# Patient Record
Sex: MALE | Race: WHITE | Employment: OTHER | URBAN - METROPOLITAN AREA
[De-identification: names, ages, dates, MRNs, and addresses within clinical notes are randomized per-mention and may not be internally consistent; named-entity substitution may affect disease eponyms.]

---

## 2017-01-19 DIAGNOSIS — I10 ESSENTIAL HYPERTENSION: ICD-10-CM

## 2017-01-19 RX ORDER — AMLODIPINE BESYLATE 5 MG/1
5 TABLET ORAL DAILY
Qty: 30 TABLET | Refills: 5 | Status: SHIPPED | OUTPATIENT
Start: 2017-01-19 | End: 2017-03-22 | Stop reason: SDUPTHER

## 2017-03-08 DIAGNOSIS — C61 PROSTATE CANCER (HCC): ICD-10-CM

## 2017-03-08 DIAGNOSIS — I10 ESSENTIAL HYPERTENSION: ICD-10-CM

## 2017-03-08 LAB
ALBUMIN SERPL-MCNC: 4 G/DL (ref 3.9–4.9)
ALP BLD-CCNC: 100 U/L (ref 35–104)
ALT SERPL-CCNC: 11 U/L (ref 0–41)
ANION GAP SERPL CALCULATED.3IONS-SCNC: 11 MEQ/L (ref 7–13)
AST SERPL-CCNC: 22 U/L (ref 0–40)
BASOPHILS ABSOLUTE: 0 K/UL (ref 0–0.2)
BASOPHILS RELATIVE PERCENT: 0.7 %
BILIRUB SERPL-MCNC: 0.5 MG/DL (ref 0–1.2)
BUN BLDV-MCNC: 19 MG/DL (ref 8–23)
CALCIUM SERPL-MCNC: 9 MG/DL (ref 8.6–10.2)
CHLORIDE BLD-SCNC: 102 MEQ/L (ref 98–107)
CO2: 27 MEQ/L (ref 22–29)
CREAT SERPL-MCNC: 0.91 MG/DL (ref 0.7–1.2)
EOSINOPHILS ABSOLUTE: 0.2 K/UL (ref 0–0.7)
EOSINOPHILS RELATIVE PERCENT: 3.5 %
GFR AFRICAN AMERICAN: >60
GFR NON-AFRICAN AMERICAN: >60
GLOBULIN: 2.5 G/DL (ref 2.3–3.5)
GLUCOSE BLD-MCNC: 82 MG/DL (ref 74–109)
HCT VFR BLD CALC: 43.5 % (ref 42–52)
HEMOGLOBIN: 14.7 G/DL (ref 14–18)
LYMPHOCYTES ABSOLUTE: 1.2 K/UL (ref 1–4.8)
LYMPHOCYTES RELATIVE PERCENT: 20.8 %
MCH RBC QN AUTO: 31.9 PG (ref 27–31.3)
MCHC RBC AUTO-ENTMCNC: 33.8 % (ref 33–37)
MCV RBC AUTO: 94.3 FL (ref 80–100)
MONOCYTES ABSOLUTE: 0.5 K/UL (ref 0.2–0.8)
MONOCYTES RELATIVE PERCENT: 9.1 %
NEUTROPHILS ABSOLUTE: 3.8 K/UL (ref 1.4–6.5)
NEUTROPHILS RELATIVE PERCENT: 65.9 %
PDW BLD-RTO: 13.3 % (ref 11.5–14.5)
PLATELET # BLD: 168 K/UL (ref 130–400)
POTASSIUM SERPL-SCNC: 4.2 MEQ/L (ref 3.5–5.1)
PROSTATE SPECIFIC ANTIGEN: 0.32 NG/ML (ref 0–6.22)
RBC # BLD: 4.61 M/UL (ref 4.7–6.1)
SODIUM BLD-SCNC: 140 MEQ/L (ref 132–144)
TOTAL PROTEIN: 6.5 G/DL (ref 6.4–8.1)
WBC # BLD: 5.8 K/UL (ref 4.8–10.8)

## 2017-03-22 ENCOUNTER — OFFICE VISIT (OUTPATIENT)
Dept: FAMILY MEDICINE CLINIC | Age: 77
End: 2017-03-22

## 2017-03-22 ENCOUNTER — TELEPHONE (OUTPATIENT)
Dept: FAMILY MEDICINE CLINIC | Age: 77
End: 2017-03-22

## 2017-03-22 VITALS
RESPIRATION RATE: 12 BRPM | WEIGHT: 149.38 LBS | HEART RATE: 66 BPM | DIASTOLIC BLOOD PRESSURE: 72 MMHG | BODY MASS INDEX: 21.39 KG/M2 | SYSTOLIC BLOOD PRESSURE: 116 MMHG | TEMPERATURE: 98.3 F | HEIGHT: 70 IN

## 2017-03-22 DIAGNOSIS — I63.40 CEREBRAL INFARCTION DUE TO EMBOLISM OF CEREBRAL ARTERY (HCC): ICD-10-CM

## 2017-03-22 DIAGNOSIS — E78.2 MIXED HYPERLIPIDEMIA: ICD-10-CM

## 2017-03-22 DIAGNOSIS — K51.40: ICD-10-CM

## 2017-03-22 DIAGNOSIS — I10 ESSENTIAL HYPERTENSION: Primary | ICD-10-CM

## 2017-03-22 DIAGNOSIS — C61 PROSTATE CANCER (HCC): ICD-10-CM

## 2017-03-22 PROCEDURE — 1036F TOBACCO NON-USER: CPT | Performed by: FAMILY MEDICINE

## 2017-03-22 PROCEDURE — G8484 FLU IMMUNIZE NO ADMIN: HCPCS | Performed by: FAMILY MEDICINE

## 2017-03-22 PROCEDURE — G8598 ASA/ANTIPLAT THER USED: HCPCS | Performed by: FAMILY MEDICINE

## 2017-03-22 PROCEDURE — G8427 DOCREV CUR MEDS BY ELIG CLIN: HCPCS | Performed by: FAMILY MEDICINE

## 2017-03-22 PROCEDURE — 1123F ACP DISCUSS/DSCN MKR DOCD: CPT | Performed by: FAMILY MEDICINE

## 2017-03-22 PROCEDURE — 99214 OFFICE O/P EST MOD 30 MIN: CPT | Performed by: FAMILY MEDICINE

## 2017-03-22 PROCEDURE — 4040F PNEUMOC VAC/ADMIN/RCVD: CPT | Performed by: FAMILY MEDICINE

## 2017-03-22 PROCEDURE — G8419 CALC BMI OUT NRM PARAM NOF/U: HCPCS | Performed by: FAMILY MEDICINE

## 2017-03-22 RX ORDER — AMLODIPINE BESYLATE 2.5 MG/1
2.5 TABLET ORAL DAILY
Qty: 30 TABLET | Refills: 5 | Status: SHIPPED | OUTPATIENT
Start: 2017-03-22 | End: 2017-09-30 | Stop reason: SDUPTHER

## 2017-03-22 ASSESSMENT — ENCOUNTER SYMPTOMS
VOMITING: 0
COUGH: 0
HEARTBURN: 0
HEMOPTYSIS: 0
CONSTIPATION: 0
NAUSEA: 0
DIARRHEA: 0
SHORTNESS OF BREATH: 0

## 2017-09-15 DIAGNOSIS — E78.2 MIXED HYPERLIPIDEMIA: ICD-10-CM

## 2017-09-15 DIAGNOSIS — I10 ESSENTIAL HYPERTENSION: ICD-10-CM

## 2017-09-15 DIAGNOSIS — C61 PROSTATE CANCER (HCC): ICD-10-CM

## 2017-09-15 LAB
ALBUMIN SERPL-MCNC: 4.2 G/DL (ref 3.9–4.9)
ALP BLD-CCNC: 99 U/L (ref 35–104)
ALT SERPL-CCNC: 12 U/L (ref 0–41)
ANION GAP SERPL CALCULATED.3IONS-SCNC: 19 MEQ/L (ref 7–13)
AST SERPL-CCNC: 22 U/L (ref 0–40)
BASOPHILS ABSOLUTE: 0 K/UL (ref 0–0.2)
BASOPHILS RELATIVE PERCENT: 0.8 %
BILIRUB SERPL-MCNC: 0.6 MG/DL (ref 0–1.2)
BUN BLDV-MCNC: 19 MG/DL (ref 8–23)
CALCIUM SERPL-MCNC: 9.3 MG/DL (ref 8.6–10.2)
CHLORIDE BLD-SCNC: 101 MEQ/L (ref 98–107)
CHOLESTEROL, TOTAL: 197 MG/DL (ref 0–199)
CO2: 22 MEQ/L (ref 22–29)
CREAT SERPL-MCNC: 0.86 MG/DL (ref 0.7–1.2)
EOSINOPHILS ABSOLUTE: 0.2 K/UL (ref 0–0.7)
EOSINOPHILS RELATIVE PERCENT: 4.7 %
GFR AFRICAN AMERICAN: >60
GFR NON-AFRICAN AMERICAN: >60
GLOBULIN: 2.9 G/DL (ref 2.3–3.5)
GLUCOSE BLD-MCNC: 103 MG/DL (ref 74–109)
HCT VFR BLD CALC: 46.8 % (ref 42–52)
HDLC SERPL-MCNC: 101 MG/DL (ref 40–59)
HEMOGLOBIN: 15.2 G/DL (ref 14–18)
LDL CHOLESTEROL CALCULATED: 78 MG/DL (ref 0–129)
LYMPHOCYTES ABSOLUTE: 1.2 K/UL (ref 1–4.8)
LYMPHOCYTES RELATIVE PERCENT: 22.4 %
MCH RBC QN AUTO: 31.7 PG (ref 27–31.3)
MCHC RBC AUTO-ENTMCNC: 32.5 % (ref 33–37)
MCV RBC AUTO: 97.3 FL (ref 80–100)
MONOCYTES ABSOLUTE: 0.4 K/UL (ref 0.2–0.8)
MONOCYTES RELATIVE PERCENT: 8 %
NEUTROPHILS ABSOLUTE: 3.3 K/UL (ref 1.4–6.5)
NEUTROPHILS RELATIVE PERCENT: 64.1 %
PDW BLD-RTO: 13.4 % (ref 11.5–14.5)
PLATELET # BLD: 179 K/UL (ref 130–400)
POTASSIUM SERPL-SCNC: 4.3 MEQ/L (ref 3.5–5.1)
PROSTATE SPECIFIC ANTIGEN: 0.77 NG/ML (ref 0–6.22)
RBC # BLD: 4.81 M/UL (ref 4.7–6.1)
SODIUM BLD-SCNC: 142 MEQ/L (ref 132–144)
TOTAL PROTEIN: 7.1 G/DL (ref 6.4–8.1)
TRIGL SERPL-MCNC: 88 MG/DL (ref 0–200)
WBC # BLD: 5.2 K/UL (ref 4.8–10.8)

## 2017-09-30 ENCOUNTER — OFFICE VISIT (OUTPATIENT)
Dept: FAMILY MEDICINE CLINIC | Age: 77
End: 2017-09-30

## 2017-09-30 VITALS
DIASTOLIC BLOOD PRESSURE: 66 MMHG | TEMPERATURE: 97.5 F | SYSTOLIC BLOOD PRESSURE: 108 MMHG | HEIGHT: 70 IN | HEART RATE: 60 BPM | WEIGHT: 149 LBS | RESPIRATION RATE: 12 BRPM | BODY MASS INDEX: 21.33 KG/M2

## 2017-09-30 DIAGNOSIS — I10 ESSENTIAL HYPERTENSION: Primary | ICD-10-CM

## 2017-09-30 DIAGNOSIS — I63.40 CEREBRAL INFARCTION DUE TO EMBOLISM OF CEREBRAL ARTERY (HCC): ICD-10-CM

## 2017-09-30 DIAGNOSIS — E78.2 MIXED HYPERLIPIDEMIA: ICD-10-CM

## 2017-09-30 DIAGNOSIS — C61 PROSTATE CANCER (HCC): ICD-10-CM

## 2017-09-30 DIAGNOSIS — Z23 NEED FOR INFLUENZA VACCINATION: ICD-10-CM

## 2017-09-30 PROCEDURE — 99214 OFFICE O/P EST MOD 30 MIN: CPT | Performed by: FAMILY MEDICINE

## 2017-09-30 PROCEDURE — 1123F ACP DISCUSS/DSCN MKR DOCD: CPT | Performed by: FAMILY MEDICINE

## 2017-09-30 PROCEDURE — G8420 CALC BMI NORM PARAMETERS: HCPCS | Performed by: FAMILY MEDICINE

## 2017-09-30 PROCEDURE — 1036F TOBACCO NON-USER: CPT | Performed by: FAMILY MEDICINE

## 2017-09-30 PROCEDURE — G8427 DOCREV CUR MEDS BY ELIG CLIN: HCPCS | Performed by: FAMILY MEDICINE

## 2017-09-30 PROCEDURE — G0008 ADMIN INFLUENZA VIRUS VAC: HCPCS | Performed by: FAMILY MEDICINE

## 2017-09-30 PROCEDURE — G8598 ASA/ANTIPLAT THER USED: HCPCS | Performed by: FAMILY MEDICINE

## 2017-09-30 PROCEDURE — 4040F PNEUMOC VAC/ADMIN/RCVD: CPT | Performed by: FAMILY MEDICINE

## 2017-09-30 PROCEDURE — 90662 IIV NO PRSV INCREASED AG IM: CPT | Performed by: FAMILY MEDICINE

## 2017-09-30 RX ORDER — AMLODIPINE BESYLATE 2.5 MG/1
2.5 TABLET ORAL DAILY
Qty: 90 TABLET | Refills: 3 | Status: SHIPPED | OUTPATIENT
Start: 2017-09-30 | End: 2018-09-20 | Stop reason: SDUPTHER

## 2017-09-30 ASSESSMENT — PATIENT HEALTH QUESTIONNAIRE - PHQ9
2. FEELING DOWN, DEPRESSED OR HOPELESS: 0
1. LITTLE INTEREST OR PLEASURE IN DOING THINGS: 0
SUM OF ALL RESPONSES TO PHQ9 QUESTIONS 1 & 2: 0
SUM OF ALL RESPONSES TO PHQ QUESTIONS 1-9: 0

## 2017-09-30 NOTE — PROGRESS NOTES
Well controlled, continue same medications. 2. Mixed hyperlipidemia  Comprehensive Metabolic Panel    Lipid Panel   3. Prostate cancer (HCC)  PSA, Diagnostic   4. 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 Need for influenza vaccination  INFLUENZA, HIGH DOSE, 65 YRS +, IM, PF, PREFILL SYR, 0.5ML (FLUZONE HD)     Plan for follow up: Follow up in 6 months for initial wellness. Other follow up as needed.       Electronically signed by Sol Ragland, 10:35 AM 9/30/17

## 2017-09-30 NOTE — MR AVS SNAPSHOT
After Visit Summary             Jace Anderson   2017 10:00 AM   Office Visit    Description:  Male : 1940   Provider:  Alex Esparza MD   Department:  [facility]              Your Follow-Up and Future Appointments         Below is a list of your follow-up and future appointments. This may not be a complete list as you may have made appointments directly with providers that we are not aware of or your providers may have made some for you. Please call your providers to confirm appointments. It is important to keep your appointments. Please bring your current insurance card, photo ID, co-pay, and all medication bottles to your appointment. If self-pay, payment is expected at the time of service. Your To-Do List     Future Appointments Provider Department Dept Phone    2018 10:15 AM Alex Esparza MD [facility] [phone]    Future Orders Complete By Expires    CBC Auto Differential [BZK2857 Custom]  3/19/2018 (Approximate) 2018    Comprehensive Metabolic Panel [IMM66 Custom]  3/19/2018 (Approximate) 2018    Lipid Panel [LAB18 Custom]  3/19/2018 (Approximate) 2018    PSA, Diagnostic [FQE9612 Custom]  3/19/2018 (Approximate) 2018    Follow-Up    Return in about 6 months (around 3/30/2018) for init med well. Information from Your Visit        Department     Name Address Phone Fax    [facility] PCP [address] [place] [address] [phone]      You Were Seen for:         Comments    Essential hypertension   [614714]   Well controlled, continue same medications. Vital Signs     Blood Pressure Pulse Temperature Respirations Height Weight    108/66 60 97.5 °F (36.4 °C) (Temporal) 12 5' 10\" (1.778 m) 149 lb (67.6 kg)    Body Mass Index Smoking Status                21.38 kg/m2 Never Smoker          Instructions    No visits with results within 2 Week(s) from this visit. Latest known visit with results is:    Orders Only on 09/15/2017   Component Date Value Ref Range Status    WBC 09/15/2017 5.2  4.8 - 10.8 K/uL Final    RBC 09/15/2017 4.81  4.70 - 6.10 M/uL Final    Hemoglobin 09/15/2017 15.2  14.0 - 18.0 g/dL Final    Hematocrit 09/15/2017 46.8  42.0 - 52.0 % Final    MCV 09/15/2017 97.3  80.0 - 100.0 fL Final    MCH 09/15/2017 31.7* 27.0 - 31.3 pg Final    MCHC 09/15/2017 32.5* 33.0 - 37.0 % Final    RDW 09/15/2017 13.4  11.5 - 14.5 % Final    Platelets 95/97/6062 179  130 - 400 K/uL Final    Neutrophils % 09/15/2017 64.1  % Final    Lymphocytes % 09/15/2017 22.4  % Final    Monocytes % 09/15/2017 8.0  % Final    Eosinophils % 09/15/2017 4.7  % Final    Basophils % 09/15/2017 0.8  % Final    Neutrophils # 09/15/2017 3.3  1.4 - 6.5 K/uL Final    Lymphocytes # 09/15/2017 1.2  1.0 - 4.8 K/uL Final    Monocytes # 09/15/2017 0.4  0.2 - 0.8 K/uL Final    Eosinophils # 09/15/2017 0.2  0.0 - 0.7 K/uL Final    Basophils # 09/15/2017 0.0  0.0 - 0.2 K/uL Final    Sodium 09/15/2017 142  132 - 144 mEq/L Final    Potassium 09/15/2017 4.3  3.5 - 5.1 mEq/L Final    Chloride 09/15/2017 101  98 - 107 mEq/L Final    CO2 09/15/2017 22  22 - 29 mEq/L Final    Anion Gap 09/15/2017 19* 7 - 13 mEq/L Final    Glucose 09/15/2017 103  74 - 109 mg/dL Final    BUN 09/15/2017 19  8 - 23 mg/dL Final    CREATININE 09/15/2017 0.86  0.70 - 1.20 mg/dL Final    GFR Non- 09/15/2017 >60.0  >60 Final    GFR  09/15/2017 >60.0  >60 Final    Calcium 09/15/2017 9.3  8.6 - 10.2 mg/dL Final    Total Protein 09/15/2017 7.1  6.4 - 8.1 g/dL Final    Alb 09/15/2017 4.2  3.9 - 4.9 g/dL Final    Total Bilirubin 09/15/2017 0.6  0.0 - 1.2 mg/dL Final    Alkaline Phosphatase 09/15/2017 99  35 - 104 U/L Final    ALT 09/15/2017 12  0 - 41 U/L Final    AST 09/15/2017 22  0 - 40 U/L Final    Globulin 09/15/2017 2.9  2.3 - 3.5 g/dL Final  Cholesterol, Total 09/15/2017 197  0 - 199 mg/dL Final    Triglycerides 09/15/2017 88  0 - 200 mg/dL Final    HDL 09/15/2017 101* 40 - 59 mg/dL Final    LDL Calculated 09/15/2017 78  0 - 129 mg/dL Final    PSA 09/15/2017 0.77  0.00 - 6.22 ng/mL Final   Last PSA was 0.37 and the one before was 0.03. Where to Get Your Medications      These medications were sent to [address] [place] [address] [address] [place], [address]  [address]     Phone:  [phone]     amLODIPine 2.5 MG tablet         Your Current Medications Are              amLODIPine (NORVASC) 2.5 MG tablet Take 1 tablet by mouth daily    Cholecalciferol (VITAMIN D-3) 5000 UNITS TABS Take  by mouth. aspirin 325 MG tablet Take 325 mg by mouth daily.         Allergies           No Known Allergies      We Ordered/Performed the following           INFLUENZA, HIGH DOSE, 65 YRS +, IM, PF, PREFILL SYR, 0.5ML (FLUZONE HD)          Additional Information        Basic Information     Date Of Birth Sex Race Ethnicity Preferred Language    1940 Male White Non-/Non  English      Problem List as of 9/30/2017  Date Reviewed: 9/30/2017                Mixed hyperlipidemia    Prostate cancer ([facility] [address]    Cerebral infarction [facility])    Essential hypertension    Related Goals    Blood Pressure < 140/90      Your Goals as of 9/30/2017 at 10:50 AM              Today    9/15/17    3/22/17       Blood Pressure    Blood Pressure < 140/90   108/66    116/72    Related Problems    Essential hypertension       Result Component    LDL CALC < 130     78        Immunizations as of 9/30/2017     Name Date    Influenza Vaccine, unspecified formulation 10/9/2014    Influenza, High Dose 9/30/2017, 9/22/2016, 11/10/2015    Pneumococcal 13-valent Conjugate ([id number]) 11/10/2015    Pneumococcal Polysaccharide ([id number]) 11/10/2003    Tetanus 10/14/2011      Preventive Care        Date Due

## 2017-09-30 NOTE — PATIENT INSTRUCTIONS
2.9  2.3 - 3.5 g/dL Final    Cholesterol, Total 09/15/2017 197  0 - 199 mg/dL Final    Triglycerides 09/15/2017 88  0 - 200 mg/dL Final    HDL 09/15/2017 101* 40 - 59 mg/dL Final    LDL Calculated 09/15/2017 78  0 - 129 mg/dL Final    PSA 09/15/2017 0.77  0.00 - 6.22 ng/mL Final   Last PSA was 0.37 and the one before was 0.03.

## 2018-02-05 ENCOUNTER — OFFICE VISIT (OUTPATIENT)
Dept: FAMILY MEDICINE CLINIC | Age: 78
End: 2018-02-05
Payer: MEDICARE

## 2018-02-05 VITALS
DIASTOLIC BLOOD PRESSURE: 82 MMHG | BODY MASS INDEX: 21.33 KG/M2 | WEIGHT: 149 LBS | RESPIRATION RATE: 24 BRPM | HEIGHT: 70 IN | HEART RATE: 72 BPM | TEMPERATURE: 98.1 F | SYSTOLIC BLOOD PRESSURE: 136 MMHG

## 2018-02-05 DIAGNOSIS — K40.90 RIGHT INGUINAL HERNIA: Primary | ICD-10-CM

## 2018-02-05 DIAGNOSIS — K40.90 LEFT INGUINAL HERNIA: ICD-10-CM

## 2018-02-05 PROCEDURE — G8427 DOCREV CUR MEDS BY ELIG CLIN: HCPCS | Performed by: FAMILY MEDICINE

## 2018-02-05 PROCEDURE — G8598 ASA/ANTIPLAT THER USED: HCPCS | Performed by: FAMILY MEDICINE

## 2018-02-05 PROCEDURE — 4040F PNEUMOC VAC/ADMIN/RCVD: CPT | Performed by: FAMILY MEDICINE

## 2018-02-05 PROCEDURE — 99213 OFFICE O/P EST LOW 20 MIN: CPT | Performed by: FAMILY MEDICINE

## 2018-02-05 PROCEDURE — G8420 CALC BMI NORM PARAMETERS: HCPCS | Performed by: FAMILY MEDICINE

## 2018-02-05 PROCEDURE — G8484 FLU IMMUNIZE NO ADMIN: HCPCS | Performed by: FAMILY MEDICINE

## 2018-02-05 PROCEDURE — 1036F TOBACCO NON-USER: CPT | Performed by: FAMILY MEDICINE

## 2018-02-05 PROCEDURE — 1123F ACP DISCUSS/DSCN MKR DOCD: CPT | Performed by: FAMILY MEDICINE

## 2018-02-05 NOTE — Clinical Note
Obvious right hernia, questionable left hernia in a patient who we will hopefully see Thursday afternoon, 2/8/18.

## 2018-02-08 ENCOUNTER — OFFICE VISIT (OUTPATIENT)
Dept: SURGERY | Age: 78
End: 2018-02-08
Payer: MEDICARE

## 2018-02-08 VITALS
WEIGHT: 162 LBS | RESPIRATION RATE: 12 BRPM | BODY MASS INDEX: 21.94 KG/M2 | HEIGHT: 72 IN | HEART RATE: 72 BPM | TEMPERATURE: 97.6 F | DIASTOLIC BLOOD PRESSURE: 70 MMHG | SYSTOLIC BLOOD PRESSURE: 140 MMHG

## 2018-02-08 DIAGNOSIS — K40.20 BILATERAL INGUINAL HERNIA WITHOUT OBSTRUCTION OR GANGRENE, RECURRENCE NOT SPECIFIED: Primary | ICD-10-CM

## 2018-02-08 PROCEDURE — 4040F PNEUMOC VAC/ADMIN/RCVD: CPT | Performed by: SURGERY

## 2018-02-08 PROCEDURE — G8420 CALC BMI NORM PARAMETERS: HCPCS | Performed by: SURGERY

## 2018-02-08 PROCEDURE — 99204 OFFICE O/P NEW MOD 45 MIN: CPT | Performed by: SURGERY

## 2018-02-08 PROCEDURE — G8427 DOCREV CUR MEDS BY ELIG CLIN: HCPCS | Performed by: SURGERY

## 2018-02-08 PROCEDURE — 1036F TOBACCO NON-USER: CPT | Performed by: SURGERY

## 2018-02-08 PROCEDURE — G8598 ASA/ANTIPLAT THER USED: HCPCS | Performed by: SURGERY

## 2018-02-08 PROCEDURE — G8484 FLU IMMUNIZE NO ADMIN: HCPCS | Performed by: SURGERY

## 2018-02-08 PROCEDURE — 1123F ACP DISCUSS/DSCN MKR DOCD: CPT | Performed by: SURGERY

## 2018-02-08 ASSESSMENT — ENCOUNTER SYMPTOMS
VOMITING: 0
TROUBLE SWALLOWING: 0
SHORTNESS OF BREATH: 0
EYE DISCHARGE: 0
BACK PAIN: 0
COLOR CHANGE: 0
CHOKING: 0
EYE PAIN: 0
ABDOMINAL DISTENTION: 0
ABDOMINAL PAIN: 0
CHEST TIGHTNESS: 0
WHEEZING: 0
ANAL BLEEDING: 0

## 2018-03-06 ENCOUNTER — OFFICE VISIT (OUTPATIENT)
Dept: FAMILY MEDICINE CLINIC | Age: 78
End: 2018-03-06
Payer: MEDICARE

## 2018-03-06 VITALS
SYSTOLIC BLOOD PRESSURE: 120 MMHG | RESPIRATION RATE: 12 BRPM | DIASTOLIC BLOOD PRESSURE: 78 MMHG | WEIGHT: 151.3 LBS | TEMPERATURE: 97.2 F | HEIGHT: 70 IN | BODY MASS INDEX: 21.66 KG/M2 | HEART RATE: 72 BPM

## 2018-03-06 DIAGNOSIS — Z00.00 ROUTINE GENERAL MEDICAL EXAMINATION AT A HEALTH CARE FACILITY: Primary | ICD-10-CM

## 2018-03-06 PROCEDURE — G8598 ASA/ANTIPLAT THER USED: HCPCS | Performed by: FAMILY MEDICINE

## 2018-03-06 PROCEDURE — G0438 PPPS, INITIAL VISIT: HCPCS | Performed by: FAMILY MEDICINE

## 2018-03-06 ASSESSMENT — ANXIETY QUESTIONNAIRES: GAD7 TOTAL SCORE: 0

## 2018-03-06 ASSESSMENT — PATIENT HEALTH QUESTIONNAIRE - PHQ9: SUM OF ALL RESPONSES TO PHQ QUESTIONS 1-9: 0

## 2018-03-06 ASSESSMENT — LIFESTYLE VARIABLES: HOW OFTEN DO YOU HAVE A DRINK CONTAINING ALCOHOL: 0

## 2018-03-06 NOTE — PROGRESS NOTES
Medicare Annual Wellness Visit  Name: Bryanna Rosario Date: 3/7/2018   MRN: [id number] Sex: Male   Age: 76 y.o. Ethnicity: Non-/Non    : 1940 Race: Martir Yun is here for [employer] for behavioral, psychosocial and functional/safety risks, and cognitive dysfunction are all negative except as indicated below. These results, as well as other patient data from the [address] form, are documented in Flowsheets linked to this Encounter. He has multiple questions about inguinal hernias. He has some. No Known Allergies    Prior to Visit Medications    Medication Sig Taking? Authorizing Provider   amLODIPine (NORVASC) 2.5 MG tablet Take 1 tablet by mouth daily Yes Carlos Becerra MD   Cholecalciferol (VITAMIN D-3) 5000 UNITS TABS Take  by mouth. Yes Historical Provider, MD   aspirin 325 MG tablet Take 325 mg by mouth daily.    Yes Historical Provider, MD       Past Medical History:   Diagnosis Date    Mini stroke ([facility] [address] [phone]    Mixed hyperlipidemia 3/22/2017    Prostate cancer ([facility] [address] 2015    Unspecified essential hypertension 10/16/2013     Past Surgical History:   Procedure Laterality Date    CARPAL TUNNEL RELEASE Left 14    [facility]    PROSTATE BIOPSY         Family History   Problem Relation Age of Onset    Stroke Mother     Heart Disease Father     Cancer Brother      prostate       CareTeam (Including outside providers/suppliers regularly involved in providing care):   Patient Care Team:  Carlos Becerra MD as PCP - General (Family Medicine)  Carlos Becerra MD as PCP - [facility] Attributed Provider    Wt Readings from Last 3 Encounters:   18 151 lb 4.8 oz (68.6 kg)   18 162 lb (73.5 kg)   18 149 lb (67.6 kg)     Vitals:    18 1425   BP: 120/78   Pulse: 72   Resp: 12   Temp: 97.2 °F (36.2 °C)   TempSrc: Temporal   Weight: 151 lb 4.8 oz (68.6 kg)   Height: 5' 10\" (1.778 m)       Neck: neck supple and non tender orders. Recommended screening schedule for the next 5-10 years is provided to the patient in written form: see Patient Instructions/AVS.  DIAGNOSIS:   1. Routine general medical examination at a health care facility       Plan for follow up: Follow up in 4 months with blood work as ordered in future orders. Other follow up as needed.       Electronically signed by Pamela Gonzalez, 10:59 AM 3/7/18

## 2018-03-08 ENCOUNTER — OFFICE VISIT (OUTPATIENT)
Dept: SURGERY | Age: 78
End: 2018-03-08
Payer: MEDICARE

## 2018-03-08 VITALS
TEMPERATURE: 97.5 F | WEIGHT: 151.2 LBS | BODY MASS INDEX: 21.69 KG/M2 | DIASTOLIC BLOOD PRESSURE: 76 MMHG | HEART RATE: 68 BPM | SYSTOLIC BLOOD PRESSURE: 122 MMHG

## 2018-03-08 DIAGNOSIS — Z01.818 PRE-OP TESTING: Primary | ICD-10-CM

## 2018-03-08 DIAGNOSIS — K40.20 BILATERAL INGUINAL HERNIA WITHOUT OBSTRUCTION OR GANGRENE, RECURRENCE NOT SPECIFIED: Primary | ICD-10-CM

## 2018-03-08 PROCEDURE — 4040F PNEUMOC VAC/ADMIN/RCVD: CPT | Performed by: SURGERY

## 2018-03-08 PROCEDURE — G8427 DOCREV CUR MEDS BY ELIG CLIN: HCPCS | Performed by: SURGERY

## 2018-03-08 PROCEDURE — G8598 ASA/ANTIPLAT THER USED: HCPCS | Performed by: SURGERY

## 2018-03-08 PROCEDURE — 99213 OFFICE O/P EST LOW 20 MIN: CPT | Performed by: SURGERY

## 2018-03-08 PROCEDURE — 1123F ACP DISCUSS/DSCN MKR DOCD: CPT | Performed by: SURGERY

## 2018-03-08 PROCEDURE — 1036F TOBACCO NON-USER: CPT | Performed by: SURGERY

## 2018-03-08 PROCEDURE — G8482 FLU IMMUNIZE ORDER/ADMIN: HCPCS | Performed by: SURGERY

## 2018-03-08 PROCEDURE — G8420 CALC BMI NORM PARAMETERS: HCPCS | Performed by: SURGERY

## 2018-03-08 RX ORDER — CALCIUM CARBONATE 500(1250)
500 TABLET ORAL DAILY
COMMUNITY

## 2018-03-08 ASSESSMENT — ENCOUNTER SYMPTOMS
WHEEZING: 0
EYE DISCHARGE: 0
ABDOMINAL PAIN: 0
CHOKING: 0
BACK PAIN: 0
ANAL BLEEDING: 0
TROUBLE SWALLOWING: 0
ABDOMINAL DISTENTION: 0
VOMITING: 0
EYE PAIN: 0
COLOR CHANGE: 0
CHEST TIGHTNESS: 0
SHORTNESS OF BREATH: 0

## 2018-03-08 NOTE — PROGRESS NOTES
hernia at patient's convenience. The risks, benefits and indications for repair of the hernia are reviewed with the patient. The potential of bleeding, infection, MI, PE/DVT, death, etc are reviewed. Anticipated convalescence is discussed. The use of prosthetic materials/ mesh is reviewed. All questions are answered and consent is obtained.         Angeline Keys MD

## 2018-03-23 ENCOUNTER — TELEPHONE (OUTPATIENT)
Dept: SURGERY | Age: 78
End: 2018-03-23

## 2018-07-17 DIAGNOSIS — C61 PROSTATE CANCER (HCC): ICD-10-CM

## 2018-07-17 DIAGNOSIS — I10 ESSENTIAL HYPERTENSION: ICD-10-CM

## 2018-07-17 DIAGNOSIS — E78.2 MIXED HYPERLIPIDEMIA: ICD-10-CM

## 2018-07-17 LAB
ALBUMIN SERPL-MCNC: 4.1 G/DL (ref 3.9–4.9)
ALP BLD-CCNC: 90 U/L (ref 35–104)
ALT SERPL-CCNC: 9 U/L (ref 0–41)
ANION GAP SERPL CALCULATED.3IONS-SCNC: 19 MEQ/L (ref 7–13)
AST SERPL-CCNC: 20 U/L (ref 0–40)
BASOPHILS ABSOLUTE: 0 K/UL (ref 0–0.2)
BASOPHILS RELATIVE PERCENT: 0.6 %
BILIRUB SERPL-MCNC: 0.6 MG/DL (ref 0–1.2)
BUN BLDV-MCNC: 18 MG/DL (ref 8–23)
CALCIUM SERPL-MCNC: 9.7 MG/DL (ref 8.6–10.2)
CHLORIDE BLD-SCNC: 102 MEQ/L (ref 98–107)
CHOLESTEROL, TOTAL: 161 MG/DL (ref 0–199)
CO2: 22 MEQ/L (ref 22–29)
CREAT SERPL-MCNC: 1.03 MG/DL (ref 0.7–1.2)
EOSINOPHILS ABSOLUTE: 0.2 K/UL (ref 0–0.7)
EOSINOPHILS RELATIVE PERCENT: 2.7 %
GFR AFRICAN AMERICAN: >60
GFR NON-AFRICAN AMERICAN: >60
GLOBULIN: 3.2 G/DL (ref 2.3–3.5)
GLUCOSE BLD-MCNC: 99 MG/DL (ref 74–109)
HCT VFR BLD CALC: 44.6 % (ref 42–52)
HDLC SERPL-MCNC: 74 MG/DL (ref 40–59)
HEMOGLOBIN: 15.2 G/DL (ref 14–18)
LDL CHOLESTEROL CALCULATED: 74 MG/DL (ref 0–129)
LYMPHOCYTES ABSOLUTE: 1 K/UL (ref 1–4.8)
LYMPHOCYTES RELATIVE PERCENT: 15.4 %
MCH RBC QN AUTO: 32.9 PG (ref 27–31.3)
MCHC RBC AUTO-ENTMCNC: 34.2 % (ref 33–37)
MCV RBC AUTO: 96.2 FL (ref 80–100)
MONOCYTES ABSOLUTE: 0.4 K/UL (ref 0.2–0.8)
MONOCYTES RELATIVE PERCENT: 6.8 %
NEUTROPHILS ABSOLUTE: 4.7 K/UL (ref 1.4–6.5)
NEUTROPHILS RELATIVE PERCENT: 74.5 %
PDW BLD-RTO: 13 % (ref 11.5–14.5)
PLATELET # BLD: 239 K/UL (ref 130–400)
POTASSIUM SERPL-SCNC: 4.5 MEQ/L (ref 3.5–5.1)
PROSTATE SPECIFIC ANTIGEN: 2.65 NG/ML (ref 0–6.22)
RBC # BLD: 4.64 M/UL (ref 4.7–6.1)
SODIUM BLD-SCNC: 143 MEQ/L (ref 132–144)
TOTAL PROTEIN: 7.3 G/DL (ref 6.4–8.1)
TRIGL SERPL-MCNC: 65 MG/DL (ref 0–200)
WBC # BLD: 6.3 K/UL (ref 4.8–10.8)

## 2018-07-24 ENCOUNTER — OFFICE VISIT (OUTPATIENT)
Dept: FAMILY MEDICINE CLINIC | Age: 78
End: 2018-07-24
Payer: MEDICARE

## 2018-07-24 VITALS
BODY MASS INDEX: 20.64 KG/M2 | OXYGEN SATURATION: 98 % | HEIGHT: 70 IN | TEMPERATURE: 97.4 F | HEART RATE: 67 BPM | WEIGHT: 144.2 LBS | SYSTOLIC BLOOD PRESSURE: 130 MMHG | DIASTOLIC BLOOD PRESSURE: 82 MMHG | RESPIRATION RATE: 10 BRPM

## 2018-07-24 DIAGNOSIS — Z00.00 ROUTINE GENERAL MEDICAL EXAMINATION AT A HEALTH CARE FACILITY: Primary | ICD-10-CM

## 2018-07-24 DIAGNOSIS — C61 PROSTATE CANCER (HCC): ICD-10-CM

## 2018-07-24 PROCEDURE — 4040F PNEUMOC VAC/ADMIN/RCVD: CPT | Performed by: FAMILY MEDICINE

## 2018-07-24 PROCEDURE — G8598 ASA/ANTIPLAT THER USED: HCPCS | Performed by: FAMILY MEDICINE

## 2018-07-24 PROCEDURE — G0438 PPPS, INITIAL VISIT: HCPCS | Performed by: FAMILY MEDICINE

## 2018-07-24 ASSESSMENT — LIFESTYLE VARIABLES: HOW OFTEN DO YOU HAVE A DRINK CONTAINING ALCOHOL: 0

## 2018-07-24 ASSESSMENT — PATIENT HEALTH QUESTIONNAIRE - PHQ9: SUM OF ALL RESPONSES TO PHQ QUESTIONS 1-9: 0

## 2018-07-24 ASSESSMENT — ANXIETY QUESTIONNAIRES: GAD7 TOTAL SCORE: 0

## 2018-07-24 NOTE — PROGRESS NOTES
Medicare Annual Wellness Visit  Name: Stan Contreras Date: 2018   MRN: [id number] Sex: Male   Age: 76 y.o. Ethnicity: Non-/Non    : 1940 Race: Alec Yun is here for [employer] for behavioral, psychosocial and functional/safety risks, and cognitive dysfunction are all negative except as indicated below. These results, as well as other patient data from the [address] [facility] form, are documented in Flowsheets linked to this Encounter. Review of recent lab work shows the PSA is up to 2.6. The patient has had prostate cancer and has had radiation therapy for that. No Known Allergies  Prior to Visit Medications    Medication Sig Taking? Authorizing Provider   calcium carbonate (OSCAL) 500 MG TABS tablet Take 500 mg by mouth daily Yes Historical Provider, MD   amLODIPine (NORVASC) 2.5 MG tablet Take 1 tablet by mouth daily Yes Mary Kate Pike MD   Cholecalciferol (VITAMIN D-3) 5000 UNITS TABS Take  by mouth. Yes Historical Provider, MD   aspirin 325 MG tablet Take 325 mg by mouth daily.    Yes Historical Provider, MD     Past Medical History:   Diagnosis Date    Mini stroke ([facility] [address] 1-1237    Mixed hyperlipidemia 3/22/2017    Prostate cancer ([facility] [address] 2015    Unspecified essential hypertension 10/16/2013     Past Surgical History:   Procedure Laterality Date    CARPAL TUNNEL RELEASE Left 14    [facility]    PROSTATE BIOPSY       Family History   Problem Relation Age of Onset    Stroke Mother     Heart Disease Father     Cancer Brother         prostate       CareTeam (Including outside providers/suppliers regularly involved in providing care):   Patient Care Team:  Mary Kate Pike MD as PCP - General (Family Medicine)  Mary Kate Pike MD as PCP - [facility] Attributed Provider    Wt Readings from Last 3 Encounters:   18 144 lb 3.2 oz (65.4 kg)   18 151 lb 3.2 oz (68.6 kg)   18 151 lb 4.8 oz (68.6 kg)     Vitals:    18 0757   BP:

## 2018-09-19 ENCOUNTER — TELEPHONE (OUTPATIENT)
Dept: FAMILY MEDICINE CLINIC | Age: 78
End: 2018-09-19

## 2018-09-20 DIAGNOSIS — I10 ESSENTIAL HYPERTENSION: ICD-10-CM

## 2018-09-21 RX ORDER — AMLODIPINE BESYLATE 2.5 MG/1
TABLET ORAL
Qty: 90 TABLET | Refills: 3 | Status: SHIPPED | OUTPATIENT
Start: 2018-09-21

## 2018-10-17 DIAGNOSIS — C61 PROSTATE CANCER (HCC): ICD-10-CM

## 2018-10-17 LAB — PROSTATE SPECIFIC ANTIGEN: 2.45 NG/ML (ref 0–6.22)

## 2018-10-24 ENCOUNTER — OFFICE VISIT (OUTPATIENT)
Dept: FAMILY MEDICINE CLINIC | Age: 78
End: 2018-10-24
Payer: MEDICARE

## 2018-10-24 VITALS
TEMPERATURE: 96.1 F | RESPIRATION RATE: 14 BRPM | DIASTOLIC BLOOD PRESSURE: 74 MMHG | WEIGHT: 148 LBS | OXYGEN SATURATION: 98 % | HEART RATE: 68 BPM | BODY MASS INDEX: 21.19 KG/M2 | HEIGHT: 70 IN | SYSTOLIC BLOOD PRESSURE: 128 MMHG

## 2018-10-24 DIAGNOSIS — E78.2 MIXED HYPERLIPIDEMIA: ICD-10-CM

## 2018-10-24 DIAGNOSIS — H61.23 BILATERAL IMPACTED CERUMEN: ICD-10-CM

## 2018-10-24 DIAGNOSIS — I10 ESSENTIAL HYPERTENSION: Primary | ICD-10-CM

## 2018-10-24 DIAGNOSIS — Z23 NEED FOR IMMUNIZATION AGAINST INFLUENZA: ICD-10-CM

## 2018-10-24 PROCEDURE — G8427 DOCREV CUR MEDS BY ELIG CLIN: HCPCS | Performed by: FAMILY MEDICINE

## 2018-10-24 PROCEDURE — G8482 FLU IMMUNIZE ORDER/ADMIN: HCPCS | Performed by: FAMILY MEDICINE

## 2018-10-24 PROCEDURE — G8420 CALC BMI NORM PARAMETERS: HCPCS | Performed by: FAMILY MEDICINE

## 2018-10-24 PROCEDURE — 90662 IIV NO PRSV INCREASED AG IM: CPT | Performed by: FAMILY MEDICINE

## 2018-10-24 PROCEDURE — 1101F PT FALLS ASSESS-DOCD LE1/YR: CPT | Performed by: FAMILY MEDICINE

## 2018-10-24 PROCEDURE — G8598 ASA/ANTIPLAT THER USED: HCPCS | Performed by: FAMILY MEDICINE

## 2018-10-24 PROCEDURE — G0008 ADMIN INFLUENZA VIRUS VAC: HCPCS | Performed by: FAMILY MEDICINE

## 2018-10-24 PROCEDURE — 99214 OFFICE O/P EST MOD 30 MIN: CPT | Performed by: FAMILY MEDICINE

## 2018-10-24 PROCEDURE — 1123F ACP DISCUSS/DSCN MKR DOCD: CPT | Performed by: FAMILY MEDICINE

## 2018-10-24 PROCEDURE — 1036F TOBACCO NON-USER: CPT | Performed by: FAMILY MEDICINE

## 2018-10-24 PROCEDURE — 4040F PNEUMOC VAC/ADMIN/RCVD: CPT | Performed by: FAMILY MEDICINE

## 2019-01-17 DIAGNOSIS — E78.2 MIXED HYPERLIPIDEMIA: ICD-10-CM

## 2019-01-17 DIAGNOSIS — I10 ESSENTIAL HYPERTENSION: ICD-10-CM

## 2019-01-17 LAB
ALBUMIN SERPL-MCNC: 4.1 G/DL (ref 3.9–4.9)
ALP BLD-CCNC: 91 U/L (ref 35–104)
ALT SERPL-CCNC: 9 U/L (ref 0–41)
ANION GAP SERPL CALCULATED.3IONS-SCNC: 15 MEQ/L (ref 7–13)
AST SERPL-CCNC: 20 U/L (ref 0–40)
BASOPHILS ABSOLUTE: 0 K/UL (ref 0–0.2)
BASOPHILS RELATIVE PERCENT: 0.8 %
BILIRUB SERPL-MCNC: 0.8 MG/DL (ref 0–1.2)
BUN BLDV-MCNC: 19 MG/DL (ref 8–23)
CALCIUM SERPL-MCNC: 9.3 MG/DL (ref 8.6–10.2)
CHLORIDE BLD-SCNC: 101 MEQ/L (ref 98–107)
CHOLESTEROL, TOTAL: 168 MG/DL (ref 0–199)
CO2: 27 MEQ/L (ref 22–29)
CREAT SERPL-MCNC: 1.01 MG/DL (ref 0.7–1.2)
EOSINOPHILS ABSOLUTE: 0.2 K/UL (ref 0–0.7)
EOSINOPHILS RELATIVE PERCENT: 3.2 %
GFR AFRICAN AMERICAN: >60
GFR NON-AFRICAN AMERICAN: >60
GLOBULIN: 3.2 G/DL (ref 2.3–3.5)
GLUCOSE BLD-MCNC: 85 MG/DL (ref 74–109)
HCT VFR BLD CALC: 45.9 % (ref 42–52)
HDLC SERPL-MCNC: 87 MG/DL (ref 40–59)
HEMOGLOBIN: 15.8 G/DL (ref 14–18)
LDL CHOLESTEROL CALCULATED: 68 MG/DL (ref 0–129)
LYMPHOCYTES ABSOLUTE: 1.1 K/UL (ref 1–4.8)
LYMPHOCYTES RELATIVE PERCENT: 21.1 %
MCH RBC QN AUTO: 32.9 PG (ref 27–31.3)
MCHC RBC AUTO-ENTMCNC: 34.4 % (ref 33–37)
MCV RBC AUTO: 95.6 FL (ref 80–100)
MONOCYTES ABSOLUTE: 0.4 K/UL (ref 0.2–0.8)
MONOCYTES RELATIVE PERCENT: 8.5 %
NEUTROPHILS ABSOLUTE: 3.4 K/UL (ref 1.4–6.5)
NEUTROPHILS RELATIVE PERCENT: 66.4 %
PDW BLD-RTO: 13.2 % (ref 11.5–14.5)
PLATELET # BLD: 213 K/UL (ref 130–400)
POTASSIUM SERPL-SCNC: 4.4 MEQ/L (ref 3.5–5.1)
RBC # BLD: 4.8 M/UL (ref 4.7–6.1)
SODIUM BLD-SCNC: 143 MEQ/L (ref 132–144)
TOTAL PROTEIN: 7.3 G/DL (ref 6.4–8.1)
TRIGL SERPL-MCNC: 65 MG/DL (ref 0–200)
WBC # BLD: 5.1 K/UL (ref 4.8–10.8)

## 2019-01-23 ENCOUNTER — OFFICE VISIT (OUTPATIENT)
Dept: FAMILY MEDICINE CLINIC | Age: 79
End: 2019-01-23
Payer: MEDICARE

## 2019-01-23 VITALS
SYSTOLIC BLOOD PRESSURE: 130 MMHG | HEART RATE: 66 BPM | BODY MASS INDEX: 20.73 KG/M2 | TEMPERATURE: 96.6 F | RESPIRATION RATE: 14 BRPM | WEIGHT: 144.8 LBS | HEIGHT: 70 IN | OXYGEN SATURATION: 97 % | DIASTOLIC BLOOD PRESSURE: 82 MMHG

## 2019-01-23 DIAGNOSIS — E78.2 MIXED HYPERLIPIDEMIA: ICD-10-CM

## 2019-01-23 DIAGNOSIS — I63.40 CEREBRAL INFARCTION DUE TO EMBOLISM OF CEREBRAL ARTERY (HCC): ICD-10-CM

## 2019-01-23 DIAGNOSIS — I10 ESSENTIAL HYPERTENSION: Primary | ICD-10-CM

## 2019-01-23 DIAGNOSIS — C61 PROSTATE CANCER (HCC): ICD-10-CM

## 2019-01-23 PROCEDURE — 4040F PNEUMOC VAC/ADMIN/RCVD: CPT | Performed by: FAMILY MEDICINE

## 2019-01-23 PROCEDURE — G8420 CALC BMI NORM PARAMETERS: HCPCS | Performed by: FAMILY MEDICINE

## 2019-01-23 PROCEDURE — G8482 FLU IMMUNIZE ORDER/ADMIN: HCPCS | Performed by: FAMILY MEDICINE

## 2019-01-23 PROCEDURE — 1036F TOBACCO NON-USER: CPT | Performed by: FAMILY MEDICINE

## 2019-01-23 PROCEDURE — 99214 OFFICE O/P EST MOD 30 MIN: CPT | Performed by: FAMILY MEDICINE

## 2019-01-23 PROCEDURE — G8427 DOCREV CUR MEDS BY ELIG CLIN: HCPCS | Performed by: FAMILY MEDICINE

## 2019-01-23 PROCEDURE — 1123F ACP DISCUSS/DSCN MKR DOCD: CPT | Performed by: FAMILY MEDICINE

## 2019-01-23 PROCEDURE — 1101F PT FALLS ASSESS-DOCD LE1/YR: CPT | Performed by: FAMILY MEDICINE

## 2019-01-23 PROCEDURE — G8598 ASA/ANTIPLAT THER USED: HCPCS | Performed by: FAMILY MEDICINE

## 2019-02-19 ENCOUNTER — TELEPHONE (OUTPATIENT)
Dept: FAMILY MEDICINE CLINIC | Age: 79
End: 2019-02-19

## 2019-02-27 ENCOUNTER — TELEPHONE (OUTPATIENT)
Dept: FAMILY MEDICINE CLINIC | Age: 79
End: 2019-02-27

## 2019-06-21 ENCOUNTER — TELEPHONE (OUTPATIENT)
Dept: ADMINISTRATIVE | Age: 79
End: 2019-06-21

## 2023-04-27 PROBLEM — M19.90 ARTHRITIS: Status: ACTIVE | Noted: 2023-04-27

## 2023-04-27 PROBLEM — N50.819 PERSISTENT PAIN IN TESTICLE: Status: ACTIVE | Noted: 2023-04-27

## 2023-04-27 PROBLEM — C61 PROSTATE CANCER (MULTI): Status: ACTIVE | Noted: 2023-04-27

## 2023-04-27 PROBLEM — E55.9 VITAMIN D DEFICIENCY: Status: ACTIVE | Noted: 2023-04-27

## 2023-04-27 PROBLEM — B37.0 ORAL THRUSH: Status: RESOLVED | Noted: 2023-04-27 | Resolved: 2023-04-27

## 2023-04-27 PROBLEM — I10 HTN (HYPERTENSION), BENIGN: Status: ACTIVE | Noted: 2023-04-27

## 2023-04-27 RX ORDER — VIT C/E/ZN/COPPR/LUTEIN/ZEAXAN 250MG-90MG
CAPSULE ORAL
COMMUNITY

## 2023-04-27 RX ORDER — AMLODIPINE BESYLATE 5 MG/1
10 TABLET ORAL DAILY
COMMUNITY
Start: 2019-11-26 | End: 2023-09-11 | Stop reason: DRUGHIGH

## 2023-04-27 RX ORDER — CHLORHEXIDINE GLUCONATE ORAL RINSE 1.2 MG/ML
SOLUTION DENTAL
COMMUNITY
Start: 2021-04-21 | End: 2023-07-28 | Stop reason: SDUPTHER

## 2023-04-27 RX ORDER — ZINC SULFATE 50(220)MG
CAPSULE ORAL
COMMUNITY

## 2023-04-27 RX ORDER — ASPIRIN 325 MG
TABLET ORAL
COMMUNITY

## 2023-04-27 RX ORDER — OXYBUTYNIN CHLORIDE 5 MG/1
1 TABLET ORAL NIGHTLY
COMMUNITY
Start: 2021-12-22 | End: 2023-05-01 | Stop reason: WASHOUT

## 2023-05-01 ENCOUNTER — OFFICE VISIT (OUTPATIENT)
Dept: PRIMARY CARE | Facility: CLINIC | Age: 83
End: 2023-05-01
Payer: MEDICARE

## 2023-05-01 VITALS
OXYGEN SATURATION: 98 % | DIASTOLIC BLOOD PRESSURE: 58 MMHG | WEIGHT: 148.6 LBS | HEART RATE: 84 BPM | TEMPERATURE: 98.4 F | HEIGHT: 70 IN | BODY MASS INDEX: 21.27 KG/M2 | SYSTOLIC BLOOD PRESSURE: 112 MMHG

## 2023-05-01 DIAGNOSIS — D68.59 HYPERCOAGULABLE STATE (MULTI): ICD-10-CM

## 2023-05-01 DIAGNOSIS — I10 HTN (HYPERTENSION), BENIGN: Primary | ICD-10-CM

## 2023-05-01 DIAGNOSIS — C61 PROSTATE CANCER (MULTI): ICD-10-CM

## 2023-05-01 DIAGNOSIS — E55.9 VITAMIN D DEFICIENCY: ICD-10-CM

## 2023-05-01 PROCEDURE — 99214 OFFICE O/P EST MOD 30 MIN: CPT | Performed by: FAMILY MEDICINE

## 2023-05-01 PROCEDURE — 3078F DIAST BP <80 MM HG: CPT | Performed by: FAMILY MEDICINE

## 2023-05-01 PROCEDURE — 1160F RVW MEDS BY RX/DR IN RCRD: CPT | Performed by: FAMILY MEDICINE

## 2023-05-01 PROCEDURE — 1159F MED LIST DOCD IN RCRD: CPT | Performed by: FAMILY MEDICINE

## 2023-05-01 PROCEDURE — 1036F TOBACCO NON-USER: CPT | Performed by: FAMILY MEDICINE

## 2023-05-01 PROCEDURE — 3074F SYST BP LT 130 MM HG: CPT | Performed by: FAMILY MEDICINE

## 2023-05-01 RX ORDER — PANTOPRAZOLE SODIUM 40 MG/1
40 TABLET, DELAYED RELEASE ORAL DAILY
COMMUNITY
Start: 2023-02-16 | End: 2023-05-01 | Stop reason: WASHOUT

## 2023-05-01 ASSESSMENT — ENCOUNTER SYMPTOMS
LOSS OF SENSATION IN FEET: 0
DEPRESSION: 0
OCCASIONAL FEELINGS OF UNSTEADINESS: 0

## 2023-05-01 NOTE — PROGRESS NOTES
"Subjective   Patient ID:  Nayan Tesfaye is a 82 y.o. male patient who presents today for       Past Medical, Surgical, and Family History reviewed and updated in chart.     Reviewed all medications by prescribing practitioner or clinical pharmacist (such as prescriptions, OTCs, herbal therapies and supplements) and documented in the medical record.     Hypertension: The patient is here for follow-up of elevated blood pressure and denies having any side effects to prescribed medication.  The patient is trying to follow a low-salt diet. He is adherent to a low salt diet. Blood pressure is well controlled at home. Patient reports no side effects to antihypertensive medication. The patient has not been hospitalized for this in the last 6 months.  Patient denies any side effects to the medications.     Arthritis: Nayan Tesfaye is an 82 y.o. male who presents with arthralgias that began several months ago. Pain is located in shoulders and knees. Patient denies any side effects to the medications.     Prostate cancer has been in remission we will follow.  Vitamin D deficiency stable with patient supplementing.  Hypercoagulable state present with patient asymptomatic and taking full-strength aspirin daily.  The patient denies SOB, dizziness, headaches, nausea, vomiting, constipation, chest pain, chest pressure.      Patient Care Team:  Christian Paiz MD as PCP - General  Christian Paiz MD [facility]        Objective   Vitals:  /58   Pulse 84   Temp 36.9 °C (98.4 °F)   Ht 1.778 m (5' 10\")   Wt 67.4 kg (148 lb 9.6 oz)   SpO2 98%   BMI 21.32 kg/m²     Physical Exam  Vitals reviewed.   Constitutional:       Appearance: Normal appearance.   Neck:      Vascular: No carotid bruit.   Cardiovascular:      Rate and Rhythm: Normal rate and regular rhythm.      Pulses: Normal pulses.      Heart sounds: Normal heart sounds.   Pulmonary:      Effort: Pulmonary effort is normal. No respiratory " distress.      Breath sounds: Normal breath sounds. No wheezing.   Abdominal:      General: There is no distension.      Palpations: Abdomen is soft. There is no mass.      Tenderness: There is no abdominal tenderness. There is no right CVA tenderness, left CVA tenderness, guarding or rebound.   Musculoskeletal:      Cervical back: Normal range of motion and neck supple. No rigidity.      Right lower leg: No edema.      Left lower leg: No edema.   Lymphadenopathy:      Cervical: No cervical adenopathy.   Neurological:      Mental Status: He is alert.         CMP, CBC, PSA, Lipid from 1/26/23.      Assessment/Plan   Problem List Items Addressed This Visit          Circulatory    HTN (hypertension), benign - Primary    Current Assessment & Plan      Is well controlled, continue with current medications.         Relevant Orders    Follow Up In Advanced Primary Care - PCP    CBC and Auto Differential    Comprehensive Metabolic Panel    Lipid Panel    Magnesium       Genitourinary    Prostate cancer (CMS/HCC)    Current Assessment & Plan      Is stable, continue with current treatment.            Endocrine/Metabolic    Vitamin D deficiency    Current Assessment & Plan      Is stable, continue with current treatment.            Hematologic    Hypercoagulable state (CMS/HCC)    Current Assessment & Plan      Is stable, continue with current treatment.            Follow up in: 6 months with labs prior.

## 2023-07-28 DIAGNOSIS — L85.3 DRY SKIN: Primary | ICD-10-CM

## 2023-07-28 RX ORDER — CHLORHEXIDINE GLUCONATE ORAL RINSE 1.2 MG/ML
15 SOLUTION DENTAL AS NEEDED
Qty: 120 ML | Refills: 1 | Status: SHIPPED | OUTPATIENT
Start: 2023-07-28 | End: 2023-12-29 | Stop reason: SDUPTHER

## 2023-07-28 NOTE — TELEPHONE ENCOUNTER
Rx Refill Request Telephone Encounter    Name:  Nayan Tesfaye  :  [id number]  Medication Name:  Chlorhexidine Gluconate 0.12% mouth/throat solution  Specific Pharmacy location:  [address]  Date of last appointment:    Date of next appointment:    Best number to reach patient:  [phone]

## 2023-08-29 ENCOUNTER — APPOINTMENT (OUTPATIENT)
Dept: PRIMARY CARE | Facility: CLINIC | Age: 83
End: 2023-08-29
Payer: MEDICARE

## 2023-09-06 NOTE — PROGRESS NOTES
"Subjective    Patient ID: Nayan Tesfaye is a 82 y.o. male who presents for Hypertension.     The patient is compliant with medications. Patient denies any side effects to the medications.      Hypertension: The patient is here for follow-up of elevated blood pressure. He is adherent to a low salt diet. Blood pressure is well controlled at home. No new myalgias or GI upset on diet control.    Prostate cancer: The patient is in remission. Is clinically stable so we will continue with current medications and lab work to confirm status ordered. Personal history of prostate cancer in 2016, currently still on lupron injections. Dr. Cornell     Ascending aorta aneurysm: Noted incidentally on chest CT 8/8/23, measuring 4.9 cm. Evaluated by cardiology 8/25- referred to CTS for further evaluation. Scheduled 9/28/23 for CTS appt and echo.    Lung nodule: 2.1 cm RUL nodule noted on chest CT during evaluation of chest pain following fall 8/8/23. Appears to have slightly increased in size from PET/CT in 2019 (without significant uptake). Never smoker. Though this is concerning for malignancy, given our discussion of goals of care and health status of patient, recommend updating PET for further evaluation.  Will reach out to his other providers to review pulmonary findings and plan of care. PET results will largely play a role in collaborative next steps.     The patient denies having the following symptoms: chest pain, chest pressure, fever, chills, N/V/D, constipation, dizziness, headaches, SOB.          Objective   Vitals:  /70   Pulse 72   Temp 35.8 °C (96.5 °F)   Resp 12   Ht 1.778 m (5' 10\")   Wt 65.5 kg (144 lb 6.4 oz)   SpO2 96%   BMI 20.72 kg/m²     Physical Exam  Vitals reviewed.   Constitutional:       Appearance: Normal appearance.   Neck:      Vascular: No carotid bruit.   Cardiovascular:      Rate and Rhythm: Normal rate and regular rhythm.      Pulses: Normal pulses.      Heart sounds: Normal heart " sounds.   Pulmonary:      Effort: Pulmonary effort is normal. No respiratory distress.      Breath sounds: Normal breath sounds. No wheezing.   Abdominal:      General: There is no distension.      Palpations: Abdomen is soft. There is no mass.      Tenderness: There is no abdominal tenderness. There is no right CVA tenderness, left CVA tenderness, guarding or rebound.   Musculoskeletal:      Cervical back: Normal range of motion and neck supple. No rigidity.      Right lower leg: No edema.      Left lower leg: No edema.   Lymphadenopathy:      Cervical: No cervical adenopathy.   Neurological:      Mental Status: He is alert.            Labs active- future.         Assessment/Plan   Problem List Items Addressed This Visit       HTN (hypertension), benign - Primary      Is stable, continue with current treatment.          Relevant Orders    Follow Up In Advanced Primary Care - PCP - Established    Malignant neoplasm of prostate (CMS/HCC)     Patient is in remission and follows with Urology          Incidental lung nodule     2.1 cm RUL nodule noted on chest CT during evaluation of chest pain following fall 8/8/23. Appears to have slightly increased in size from PET/CT in 2019 (without significant uptake). Never smoker. Though this is concerning for malignancy, given our discussion of goals of care and health status of patient, recommend updating PET for further evaluation.  Will reach out to his other providers to review pulmonary findings and plan of care. PET results will largely play a role in collaborative next steps.          Aneurysm of ascending aorta without rupture (CMS/HCC)     Noted incidentally on chest CT 8/8/23, measuring 4.9 cm. Evaluated by cardiology 8/25- referred to CTS for further evaluation. Scheduled 9/28/23 for CTS appt and echo.          Other Visit Diagnoses       Need for influenza vaccination        Relevant Orders    Flu vaccine, quadrivalent, high-dose, preservative free, age 65y+  (FLUZONE)            Follow up in: 6 month(s) or sooner if needed with labs today.     Scribe Attestation  By signing my name below, I, Anurag Dobbins   attest that this documentation has been prepared under the direction and in the presence of Christian Paiz MD.

## 2023-09-11 ENCOUNTER — OFFICE VISIT (OUTPATIENT)
Dept: PRIMARY CARE | Facility: CLINIC | Age: 83
End: 2023-09-11
Payer: MEDICARE

## 2023-09-11 ENCOUNTER — LAB (OUTPATIENT)
Dept: LAB | Facility: LAB | Age: 83
End: 2023-09-11
Payer: MEDICARE

## 2023-09-11 VITALS
TEMPERATURE: 96.5 F | DIASTOLIC BLOOD PRESSURE: 70 MMHG | OXYGEN SATURATION: 96 % | BODY MASS INDEX: 20.67 KG/M2 | HEART RATE: 72 BPM | SYSTOLIC BLOOD PRESSURE: 118 MMHG | WEIGHT: 144.4 LBS | RESPIRATION RATE: 12 BRPM | HEIGHT: 70 IN

## 2023-09-11 DIAGNOSIS — I10 HTN (HYPERTENSION), BENIGN: Primary | ICD-10-CM

## 2023-09-11 DIAGNOSIS — Z23 NEED FOR INFLUENZA VACCINATION: ICD-10-CM

## 2023-09-11 DIAGNOSIS — I10 HTN (HYPERTENSION), BENIGN: ICD-10-CM

## 2023-09-11 DIAGNOSIS — C61 PROSTATE CANCER (MULTI): ICD-10-CM

## 2023-09-11 DIAGNOSIS — R91.1 INCIDENTAL LUNG NODULE: ICD-10-CM

## 2023-09-11 DIAGNOSIS — I71.21 ANEURYSM OF ASCENDING AORTA WITHOUT RUPTURE (CMS-HCC): ICD-10-CM

## 2023-09-11 LAB
ALANINE AMINOTRANSFERASE (SGPT) (U/L) IN SER/PLAS: 12 U/L (ref 10–52)
ALBUMIN (G/DL) IN SER/PLAS: 4.3 G/DL (ref 3.4–5)
ALKALINE PHOSPHATASE (U/L) IN SER/PLAS: 125 U/L (ref 33–136)
ANION GAP IN SER/PLAS: 15 MMOL/L (ref 10–20)
ASPARTATE AMINOTRANSFERASE (SGOT) (U/L) IN SER/PLAS: 21 U/L (ref 9–39)
BASOPHILS (10*3/UL) IN BLOOD BY AUTOMATED COUNT: 0.02 X10E9/L (ref 0–0.1)
BASOPHILS/100 LEUKOCYTES IN BLOOD BY AUTOMATED COUNT: 0.4 % (ref 0–2)
BILIRUBIN TOTAL (MG/DL) IN SER/PLAS: 0.6 MG/DL (ref 0–1.2)
CALCIUM (MG/DL) IN SER/PLAS: 9.5 MG/DL (ref 8.6–10.3)
CARBON DIOXIDE, TOTAL (MMOL/L) IN SER/PLAS: 27 MMOL/L (ref 21–32)
CHLORIDE (MMOL/L) IN SER/PLAS: 100 MMOL/L (ref 98–107)
CHOLESTEROL (MG/DL) IN SER/PLAS: 182 MG/DL (ref 0–199)
CHOLESTEROL IN HDL (MG/DL) IN SER/PLAS: 76.6 MG/DL
CHOLESTEROL/HDL RATIO: 2.4
CREATININE (MG/DL) IN SER/PLAS: 1.03 MG/DL (ref 0.5–1.3)
EOSINOPHILS (10*3/UL) IN BLOOD BY AUTOMATED COUNT: 0.08 X10E9/L (ref 0–0.4)
EOSINOPHILS/100 LEUKOCYTES IN BLOOD BY AUTOMATED COUNT: 1.5 % (ref 0–6)
ERYTHROCYTE DISTRIBUTION WIDTH (RATIO) BY AUTOMATED COUNT: 12.5 % (ref 11.5–14.5)
ERYTHROCYTE MEAN CORPUSCULAR HEMOGLOBIN CONCENTRATION (G/DL) BY AUTOMATED: 33.8 G/DL (ref 32–36)
ERYTHROCYTE MEAN CORPUSCULAR VOLUME (FL) BY AUTOMATED COUNT: 96 FL (ref 80–100)
ERYTHROCYTES (10*6/UL) IN BLOOD BY AUTOMATED COUNT: 4.72 X10E12/L (ref 4.5–5.9)
GFR MALE: 71 ML/MIN/1.73M2
GLUCOSE (MG/DL) IN SER/PLAS: 83 MG/DL (ref 74–99)
HEMATOCRIT (%) IN BLOOD BY AUTOMATED COUNT: 45.2 % (ref 41–52)
HEMOGLOBIN (G/DL) IN BLOOD: 15.3 G/DL (ref 13.5–17.5)
IMMATURE GRANULOCYTES/100 LEUKOCYTES IN BLOOD BY AUTOMATED COUNT: 0.2 % (ref 0–0.9)
LDL: 81 MG/DL (ref 0–99)
LEUKOCYTES (10*3/UL) IN BLOOD BY AUTOMATED COUNT: 5.3 X10E9/L (ref 4.4–11.3)
LYMPHOCYTES (10*3/UL) IN BLOOD BY AUTOMATED COUNT: 0.93 X10E9/L (ref 0.8–3)
LYMPHOCYTES/100 LEUKOCYTES IN BLOOD BY AUTOMATED COUNT: 17.5 % (ref 13–44)
MAGNESIUM (MG/DL) IN SER/PLAS: 2.13 MG/DL (ref 1.6–2.4)
MONOCYTES (10*3/UL) IN BLOOD BY AUTOMATED COUNT: 0.79 X10E9/L (ref 0.05–0.8)
MONOCYTES/100 LEUKOCYTES IN BLOOD BY AUTOMATED COUNT: 14.9 % (ref 2–10)
NEUTROPHILS (10*3/UL) IN BLOOD BY AUTOMATED COUNT: 3.48 X10E9/L (ref 1.6–5.5)
NEUTROPHILS/100 LEUKOCYTES IN BLOOD BY AUTOMATED COUNT: 65.5 % (ref 40–80)
PLATELETS (10*3/UL) IN BLOOD AUTOMATED COUNT: 194 X10E9/L (ref 150–450)
POTASSIUM (MMOL/L) IN SER/PLAS: 3.9 MMOL/L (ref 3.5–5.3)
PROSTATE SPECIFIC AG (NG/ML) IN SER/PLAS: 1.25 NG/ML (ref 0–4)
PROTEIN TOTAL: 7.3 G/DL (ref 6.4–8.2)
SODIUM (MMOL/L) IN SER/PLAS: 138 MMOL/L (ref 136–145)
TRIGLYCERIDE (MG/DL) IN SER/PLAS: 123 MG/DL (ref 0–149)
UREA NITROGEN (MG/DL) IN SER/PLAS: 17 MG/DL (ref 6–23)
VLDL: 25 MG/DL (ref 0–40)

## 2023-09-11 PROCEDURE — 99213 OFFICE O/P EST LOW 20 MIN: CPT | Performed by: FAMILY MEDICINE

## 2023-09-11 PROCEDURE — 1160F RVW MEDS BY RX/DR IN RCRD: CPT | Performed by: FAMILY MEDICINE

## 2023-09-11 PROCEDURE — 3074F SYST BP LT 130 MM HG: CPT | Performed by: FAMILY MEDICINE

## 2023-09-11 PROCEDURE — 90662 IIV NO PRSV INCREASED AG IM: CPT | Performed by: FAMILY MEDICINE

## 2023-09-11 PROCEDURE — 36415 COLL VENOUS BLD VENIPUNCTURE: CPT

## 2023-09-11 PROCEDURE — 83735 ASSAY OF MAGNESIUM: CPT

## 2023-09-11 PROCEDURE — 84153 ASSAY OF PSA TOTAL: CPT

## 2023-09-11 PROCEDURE — G0008 ADMIN INFLUENZA VIRUS VAC: HCPCS | Performed by: FAMILY MEDICINE

## 2023-09-11 PROCEDURE — 80053 COMPREHEN METABOLIC PANEL: CPT

## 2023-09-11 PROCEDURE — 3078F DIAST BP <80 MM HG: CPT | Performed by: FAMILY MEDICINE

## 2023-09-11 PROCEDURE — 85025 COMPLETE CBC W/AUTO DIFF WBC: CPT

## 2023-09-11 PROCEDURE — 80061 LIPID PANEL: CPT

## 2023-09-11 PROCEDURE — 1036F TOBACCO NON-USER: CPT | Performed by: FAMILY MEDICINE

## 2023-09-11 PROCEDURE — 1159F MED LIST DOCD IN RCRD: CPT | Performed by: FAMILY MEDICINE

## 2023-09-11 RX ORDER — AMLODIPINE BESYLATE 10 MG/1
10 TABLET ORAL DAILY
COMMUNITY
End: 2024-03-22 | Stop reason: SDUPTHER

## 2023-09-11 NOTE — ASSESSMENT & PLAN NOTE
2.1 cm RUL nodule noted on chest CT during evaluation of chest pain following fall 8/8/23. Appears to have slightly increased in size from PET/CT in 2019 (without significant uptake). Never smoker. Though this is concerning for malignancy, given our discussion of goals of care and health status of patient, recommend updating PET for further evaluation.  Will reach out to his other providers to review pulmonary findings and plan of care. PET results will largely play a role in collaborative next steps.

## 2023-09-11 NOTE — ASSESSMENT & PLAN NOTE
Noted incidentally on chest CT 8/8/23, measuring 4.9 cm. Evaluated by cardiology 8/25- referred to CTS for further evaluation. Scheduled 9/28/23 for CTS appt and echo.

## 2023-11-02 ENCOUNTER — HOSPITAL ENCOUNTER (OUTPATIENT)
Age: 83
Setting detail: OBSERVATION
Discharge: LEFT BEFORE DISCHARGE | End: 2023-11-03
Attending: EMERGENCY MEDICINE | Admitting: INTERNAL MEDICINE
Payer: MEDICARE

## 2023-11-02 ENCOUNTER — APPOINTMENT (OUTPATIENT)
Dept: CT IMAGING | Age: 83
End: 2023-11-02
Payer: MEDICARE

## 2023-11-02 DIAGNOSIS — R55 SYNCOPE AND COLLAPSE: Primary | ICD-10-CM

## 2023-11-02 DIAGNOSIS — R41.0 TRANSIENT CONFUSION: ICD-10-CM
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
## 2023-11-02 LAB
ALBUMIN SERPL-MCNC: 4.1 G/DL (ref 3.5–4.6)
ALP SERPL-CCNC: 120 U/L (ref 35–104)
ALT SERPL-CCNC: 13 U/L (ref 0–41)
ANION GAP SERPL CALCULATED.3IONS-SCNC: 12 MEQ/L (ref 9–15)
AST SERPL-CCNC: 21 U/L (ref 0–40)
BASOPHILS # BLD: 0 K/UL (ref 0–0.2)
BASOPHILS NFR BLD: 0.4 %
BILIRUB SERPL-MCNC: 0.3 MG/DL (ref 0.2–0.7)
BILIRUB UR QL STRIP: NEGATIVE
BUN SERPL-MCNC: 24 MG/DL (ref 8–23)
CALCIUM SERPL-MCNC: 9.1 MG/DL (ref 8.5–9.9)
CHLORIDE SERPL-SCNC: 102 MEQ/L (ref 95–107)
CLARITY UR: CLEAR
CO2 SERPL-SCNC: 25 MEQ/L (ref 20–31)
COLOR UR: YELLOW
CREAT SERPL-MCNC: 0.94 MG/DL (ref 0.7–1.2)
EOSINOPHIL # BLD: 0.2 K/UL (ref 0–0.7)
EOSINOPHIL NFR BLD: 4.4 %
ERYTHROCYTE [DISTWIDTH] IN BLOOD BY AUTOMATED COUNT: 12.4 % (ref 11.5–14.5)
GLOBULIN SER CALC-MCNC: 2.9 G/DL (ref 2.3–3.5)
GLUCOSE SERPL-MCNC: 99 MG/DL (ref 70–99)
GLUCOSE UR STRIP-MCNC: NEGATIVE MG/DL
HCT VFR BLD AUTO: 41 % (ref 42–52)
HGB BLD-MCNC: 14.2 G/DL (ref 14–18)
HGB UR QL STRIP: NEGATIVE
KETONES UR STRIP-MCNC: NEGATIVE MG/DL
LEUKOCYTE ESTERASE UR QL STRIP: NEGATIVE
LYMPHOCYTES # BLD: 1.1 K/UL (ref 1–4.8)
LYMPHOCYTES NFR BLD: 21.8 %
MCH RBC QN AUTO: 32.3 PG (ref 27–31.3)
MCHC RBC AUTO-ENTMCNC: 34.6 % (ref 33–37)
MCV RBC AUTO: 93.4 FL (ref 79–92.2)
MONOCYTES # BLD: 0.6 K/UL (ref 0.2–0.8)
MONOCYTES NFR BLD: 11.1 %
NEUTROPHILS # BLD: 3.3 K/UL (ref 1.4–6.5)
NEUTS SEG NFR BLD: 62.1 %
NITRITE UR QL STRIP: NEGATIVE
PH UR STRIP: 5.5 [PH] (ref 5–9)
PLATELET # BLD AUTO: 201 K/UL (ref 130–400)
POTASSIUM SERPL-SCNC: 3.6 MEQ/L (ref 3.4–4.9)
PROT SERPL-MCNC: 7 G/DL (ref 6.3–8)
PROT UR STRIP-MCNC: ABNORMAL MG/DL
RBC # BLD AUTO: 4.39 M/UL (ref 4.7–6.1)
SODIUM SERPL-SCNC: 139 MEQ/L (ref 135–144)
SP GR UR STRIP: 1.02 (ref 1–1.03)
TROPONIN, HIGH SENSITIVITY: 12 NG/L (ref 0–19)
TROPONIN, HIGH SENSITIVITY: 12 NG/L (ref 0–19)
URINE REFLEX TO CULTURE: ABNORMAL
UROBILINOGEN UR STRIP-ACNC: 0.2 E.U./DL
WBC # BLD AUTO: 5.2 K/UL (ref 4.8–10.8)

## 2023-11-02 PROCEDURE — 2580000003 HC RX 258: Performed by: INTERNAL MEDICINE

## 2023-11-02 PROCEDURE — 96360 HYDRATION IV INFUSION INIT: CPT

## 2023-11-02 PROCEDURE — 80053 COMPREHEN METABOLIC PANEL: CPT

## 2023-11-02 PROCEDURE — 81003 URINALYSIS AUTO W/O SCOPE: CPT

## 2023-11-02 PROCEDURE — 70450 CT HEAD/BRAIN W/O DYE: CPT

## 2023-11-02 PROCEDURE — 85025 COMPLETE CBC W/AUTO DIFF WBC: CPT

## 2023-11-02 PROCEDURE — 6830039000 HC L3 TRAUMA ALERT

## 2023-11-02 PROCEDURE — 99285 EMERGENCY DEPT VISIT HI MDM: CPT

## 2023-11-02 PROCEDURE — 84484 ASSAY OF TROPONIN QUANT: CPT

## 2023-11-02 PROCEDURE — 82550 ASSAY OF CK (CPK): CPT

## 2023-11-02 PROCEDURE — 93005 ELECTROCARDIOGRAM TRACING: CPT | Performed by: EMERGENCY MEDICINE

## 2023-11-02 PROCEDURE — 36415 COLL VENOUS BLD VENIPUNCTURE: CPT

## 2023-11-02 PROCEDURE — 2580000003 HC RX 258: Performed by: EMERGENCY MEDICINE

## 2023-11-02 RX ORDER — POLYETHYLENE GLYCOL 3350 17 G/17G
17 POWDER, FOR SOLUTION ORAL DAILY PRN
Status: DISCONTINUED | OUTPATIENT
Start: 2023-11-02 | End: 2023-11-03 | Stop reason: HOSPADM

## 2023-11-02 RX ORDER — MAGNESIUM SULFATE IN WATER 40 MG/ML
2000 INJECTION, SOLUTION INTRAVENOUS PRN
Status: DISCONTINUED | OUTPATIENT
Start: 2023-11-02 | End: 2023-11-03 | Stop reason: HOSPADM

## 2023-11-02 RX ORDER — ENOXAPARIN SODIUM 100 MG/ML
40 INJECTION SUBCUTANEOUS DAILY
Status: DISCONTINUED | OUTPATIENT
Start: 2023-11-03 | End: 2023-11-03 | Stop reason: HOSPADM

## 2023-11-02 RX ORDER — ACETAMINOPHEN 650 MG/1
650 SUPPOSITORY RECTAL EVERY 6 HOURS PRN
Status: DISCONTINUED | OUTPATIENT
Start: 2023-11-02 | End: 2023-11-03 | Stop reason: HOSPADM

## 2023-11-02 RX ORDER — POTASSIUM CHLORIDE 20 MEQ/1
40 TABLET, EXTENDED RELEASE ORAL PRN
Status: DISCONTINUED | OUTPATIENT
Start: 2023-11-02 | End: 2023-11-03 | Stop reason: HOSPADM

## 2023-11-02 RX ORDER — SODIUM CHLORIDE 9 MG/ML
INJECTION, SOLUTION INTRAVENOUS PRN
Status: DISCONTINUED | OUTPATIENT
Start: 2023-11-02 | End: 2023-11-03 | Stop reason: HOSPADM

## 2023-11-02 RX ORDER — ACETAMINOPHEN 325 MG/1
650 TABLET ORAL EVERY 6 HOURS PRN
Status: DISCONTINUED | OUTPATIENT
Start: 2023-11-02 | End: 2023-11-03 | Stop reason: HOSPADM

## 2023-11-02 RX ORDER — POTASSIUM CHLORIDE 7.45 MG/ML
10 INJECTION INTRAVENOUS PRN
Status: DISCONTINUED | OUTPATIENT
Start: 2023-11-02 | End: 2023-11-03 | Stop reason: HOSPADM

## 2023-11-02 RX ORDER — SODIUM CHLORIDE 0.9 % (FLUSH) 0.9 %
5-40 SYRINGE (ML) INJECTION EVERY 12 HOURS SCHEDULED
Status: DISCONTINUED | OUTPATIENT
Start: 2023-11-02 | End: 2023-11-03 | Stop reason: HOSPADM

## 2023-11-02 RX ORDER — ONDANSETRON 4 MG/1
4 TABLET, ORALLY DISINTEGRATING ORAL EVERY 8 HOURS PRN
Status: DISCONTINUED | OUTPATIENT
Start: 2023-11-02 | End: 2023-11-03 | Stop reason: HOSPADM

## 2023-11-02 RX ORDER — 0.9 % SODIUM CHLORIDE 0.9 %
1000 INTRAVENOUS SOLUTION INTRAVENOUS ONCE
Status: COMPLETED | OUTPATIENT
Start: 2023-11-02 | End: 2023-11-02

## 2023-11-02 RX ORDER — SODIUM CHLORIDE 0.9 % (FLUSH) 0.9 %
5-40 SYRINGE (ML) INJECTION PRN
Status: DISCONTINUED | OUTPATIENT
Start: 2023-11-02 | End: 2023-11-03 | Stop reason: HOSPADM

## 2023-11-02 RX ORDER — ONDANSETRON 2 MG/ML
4 INJECTION INTRAMUSCULAR; INTRAVENOUS EVERY 6 HOURS PRN
Status: DISCONTINUED | OUTPATIENT
Start: 2023-11-02 | End: 2023-11-03 | Stop reason: HOSPADM

## 2023-11-02 RX ADMIN — SODIUM CHLORIDE 1000 ML: 9 INJECTION, SOLUTION INTRAVENOUS at 20:52

## 2023-11-02 RX ADMIN — Medication 5 ML: at 23:10

## 2023-11-02 ASSESSMENT — ENCOUNTER SYMPTOMS
ABDOMINAL PAIN: 0
ABDOMINAL DISTENTION: 0
VOMITING: 0
SORE THROAT: 0
WHEEZING: 0
PHOTOPHOBIA: 0
SHORTNESS OF BREATH: 0
COUGH: 0
EYE DISCHARGE: 0
CHEST TIGHTNESS: 0

## 2023-11-02 ASSESSMENT — PAIN - FUNCTIONAL ASSESSMENT: PAIN_FUNCTIONAL_ASSESSMENT: NONE - DENIES PAIN

## 2023-11-03 ENCOUNTER — APPOINTMENT (OUTPATIENT)
Dept: ULTRASOUND IMAGING | Age: 83
End: 2023-11-03
Payer: MEDICARE

## 2023-11-03 VITALS
SYSTOLIC BLOOD PRESSURE: 138 MMHG | BODY MASS INDEX: 19.31 KG/M2 | RESPIRATION RATE: 18 BRPM | HEIGHT: 72 IN | WEIGHT: 142.6 LBS | DIASTOLIC BLOOD PRESSURE: 80 MMHG | HEART RATE: 60 BPM | OXYGEN SATURATION: 98 % | TEMPERATURE: 97.9 F

## 2023-11-03 LAB
ANION GAP SERPL CALCULATED.3IONS-SCNC: 9 MEQ/L (ref 9–15)
BASOPHILS # BLD: 0 K/UL (ref 0–0.2)
BASOPHILS NFR BLD: 0.3 %
BUN SERPL-MCNC: 14 MG/DL (ref 8–23)
CALCIUM SERPL-MCNC: 9 MG/DL (ref 8.5–9.9)
CHLORIDE SERPL-SCNC: 106 MEQ/L (ref 95–107)
CK SERPL-CCNC: 93 U/L (ref 0–190)
CO2 SERPL-SCNC: 26 MEQ/L (ref 20–31)
CREAT SERPL-MCNC: 0.75 MG/DL (ref 0.7–1.2)
EKG ATRIAL RATE: 60 BPM
EKG P AXIS: 87 DEGREES
EKG P-R INTERVAL: 192 MS
EKG Q-T INTERVAL: 486 MS
EKG QRS DURATION: 152 MS
EKG QTC CALCULATION (BAZETT): 486 MS
EKG R AXIS: 72 DEGREES
EKG T AXIS: 97 DEGREES
EKG VENTRICULAR RATE: 60 BPM
EOSINOPHIL # BLD: 0.2 K/UL (ref 0–0.7)
EOSINOPHIL NFR BLD: 2.7 %
ERYTHROCYTE [DISTWIDTH] IN BLOOD BY AUTOMATED COUNT: 12.4 % (ref 11.5–14.5)
GLUCOSE SERPL-MCNC: 90 MG/DL (ref 70–99)
HCT VFR BLD AUTO: 42.3 % (ref 42–52)
HGB BLD-MCNC: 14.6 G/DL (ref 14–18)
LYMPHOCYTES # BLD: 1.2 K/UL (ref 1–4.8)
LYMPHOCYTES NFR BLD: 16.4 %
MAGNESIUM SERPL-MCNC: 2.2 MG/DL (ref 1.7–2.4)
MCH RBC QN AUTO: 31.9 PG (ref 27–31.3)
MCHC RBC AUTO-ENTMCNC: 34.5 % (ref 33–37)
MCV RBC AUTO: 92.6 FL (ref 79–92.2)
MONOCYTES # BLD: 0.8 K/UL (ref 0.2–0.8)
MONOCYTES NFR BLD: 11.4 %
NEUTROPHILS # BLD: 4.9 K/UL (ref 1.4–6.5)
NEUTS SEG NFR BLD: 68.9 %
PHOSPHATE SERPL-MCNC: 3 MG/DL (ref 2.3–4.8)
PLATELET # BLD AUTO: 206 K/UL (ref 130–400)
POTASSIUM SERPL-SCNC: 3.3 MEQ/L (ref 3.4–4.9)
RBC # BLD AUTO: 4.57 M/UL (ref 4.7–6.1)
SODIUM SERPL-SCNC: 141 MEQ/L (ref 135–144)
WBC # BLD AUTO: 7.1 K/UL (ref 4.8–10.8)

## 2023-11-03 PROCEDURE — G0378 HOSPITAL OBSERVATION PER HR: HCPCS

## 2023-11-03 PROCEDURE — 6360000002 HC RX W HCPCS: Performed by: INTERNAL MEDICINE

## 2023-11-03 PROCEDURE — 96372 THER/PROPH/DIAG INJ SC/IM: CPT

## 2023-11-03 PROCEDURE — 85025 COMPLETE CBC W/AUTO DIFF WBC: CPT

## 2023-11-03 PROCEDURE — 83735 ASSAY OF MAGNESIUM: CPT

## 2023-11-03 PROCEDURE — 36415 COLL VENOUS BLD VENIPUNCTURE: CPT

## 2023-11-03 PROCEDURE — 93880 EXTRACRANIAL BILAT STUDY: CPT

## 2023-11-03 PROCEDURE — 2580000003 HC RX 258: Performed by: INTERNAL MEDICINE

## 2023-11-03 PROCEDURE — 80048 BASIC METABOLIC PNL TOTAL CA: CPT

## 2023-11-03 PROCEDURE — 99222 1ST HOSP IP/OBS MODERATE 55: CPT | Performed by: PSYCHIATRY & NEUROLOGY

## 2023-11-03 PROCEDURE — 84100 ASSAY OF PHOSPHORUS: CPT

## 2023-11-03 PROCEDURE — 99223 1ST HOSP IP/OBS HIGH 75: CPT | Performed by: INTERNAL MEDICINE

## 2023-11-03 PROCEDURE — 6370000000 HC RX 637 (ALT 250 FOR IP): Performed by: INTERNAL MEDICINE

## 2023-11-03 RX ORDER — ASPIRIN 81 MG/1
81 TABLET, CHEWABLE ORAL DAILY
Status: DISCONTINUED | OUTPATIENT
Start: 2023-11-04 | End: 2023-11-03 | Stop reason: HOSPADM

## 2023-11-03 RX ORDER — AMLODIPINE BESYLATE 2.5 MG/1
2.5 TABLET ORAL DAILY
Status: DISCONTINUED | OUTPATIENT
Start: 2023-11-04 | End: 2023-11-03 | Stop reason: HOSPADM

## 2023-11-03 RX ORDER — VITAMIN B COMPLEX
5000 TABLET ORAL DAILY
Status: DISCONTINUED | OUTPATIENT
Start: 2023-11-03 | End: 2023-11-03 | Stop reason: HOSPADM

## 2023-11-03 RX ADMIN — Medication 10 ML: at 12:24

## 2023-11-03 RX ADMIN — ENOXAPARIN SODIUM 40 MG: 100 INJECTION SUBCUTANEOUS at 15:10

## 2023-11-03 RX ADMIN — Medication 5000 UNITS: at 15:10

## 2023-11-03 ASSESSMENT — ENCOUNTER SYMPTOMS
RESPIRATORY NEGATIVE: 1
EYES NEGATIVE: 1
BLOOD IN STOOL: 0
GASTROINTESTINAL NEGATIVE: 1
STRIDOR: 0
WHEEZING: 0
CHEST TIGHTNESS: 0
NAUSEA: 0
COLOR CHANGE: 0
BACK PAIN: 0
VOMITING: 0
COUGH: 0
CHOKING: 0
SHORTNESS OF BREATH: 0
PHOTOPHOBIA: 0
TROUBLE SWALLOWING: 0

## 2023-11-03 NOTE — FLOWSHEET NOTE
Pt wanting to leave AMA stated \"I do not want to wait for anymore testing\" encouraged pt to stay until Dr Allan Maier consult. Pt decided to stay. Electronically signed by Kade Amin RN on 11/3/2023 at 5:46 PM

## 2023-11-03 NOTE — CARE COORDINATION
Case Management Assessment  Initial Evaluation    Date/Time of Evaluation: 11/3/2023 11:00 AM  Assessment Completed by: Angella Lainez RN    If patient is discharged prior to next notation, then this note serves as note for discharge by case management. Patient Name: Georges Padron                   YOB: 1940  Diagnosis: Syncope and collapse [R55]  Transient confusion [R41.0]  Fall, initial encounter [W19. XXXA]                   Date / Time: 11/2/2023  8:12 PM    Patient Admission Status: Observation   Readmission Risk (Low < 19, Mod (19-27), High > 27): No data recorded  Current PCP: Yana Clancy MD  PCP verified by CM? Yes    Chart Reviewed: Yes      History Provided by: Patient  Patient Orientation: Alert and Oriented, Person, Place, Situation, Self    Patient Cognition: Alert    Hospitalization in the last 30 days (Readmission):  No    If yes, Readmission Assessment in CM Navigator will be completed. Advance Directives:      Code Status: Full Code   Patient's Primary Decision Maker is: Legal Next of Kin      Discharge Planning:    Patient lives with: Spouse/Significant Other, Children Type of Home: House  Primary Care Giver: Family  Patient Support Systems include: Spouse/Significant Other, Children   Current Financial resources:    Current community resources:    Current services prior to admission: None            Current DME:              Type of Home Care services:  None    ADLS  Prior functional level: Independent in ADLs/IADLs  Current functional level: Independent in ADLs/IADLs    PT AM-PAC:   /24  OT AM-PAC:   /24    Family can provide assistance at DC: Yes  Would you like Case Management to discuss the discharge plan with any other family members/significant others, and if so, who?  Yes (PT SON YAYA)  Plans to Return to Present Housing: Yes  Other Identified Issues/Barriers to RETURNING to current housing: NO  Potential Assistance needed at discharge: N/A            Potential DME:    Patient expects to discharge to: House  Plan for transportation at discharge:      Financial    Payor: Christal Mccann / Plan: MEDICARE PART A AND B / Product Type: *No Product type* /     Does insurance require precert for SNF: No    Potential assistance Purchasing Medications:    Meds-to-Beds request: Yes      [facility] [address] [place], [address] [address],[address] [address]  Phone: [phone] Fax: [phone]      Notes:    Factors facilitating achievement of predicted outcomes: Family support    Barriers to discharge: Medical complications    Additional Case Management Notes: MET WITH PT AND SON YAYA AT BEDSIDE. PT FROM HOME WITH SON AND SPOUSE. NO DME, NO O2, NO HD. PT IS A  RESERVE AND STATES NOT VA CONNECTED. PT WAS INDEPENDENT PRIOR, BOWLING AND WALKS DOG. PT DENIES DC NEEDS AND PLANS TO RETURN HOME. The Plan for Transition of Care is related to the following treatment goals of Syncope and collapse [R55]  Transient confusion [R41.0]  Fall, initial encounter [W19. XXXA]    IF APPLICABLE: The Patient and/or patient representative Christie Zhu and his family were provided with a choice of provider and agrees with the discharge plan. Freedom of choice list with basic dialogue that supports the patient's individualized plan of care/goals and shares the quality data associated with the providers was provided to:     Patient Representative Name:       The Patient and/or Patient Representative Agree with the Discharge Plan?       Edd Sepulveda RN  Case Management Department

## 2023-11-03 NOTE — FLOWSHEET NOTE
Pt refuses to stay. Educated several times on importance of staying d/t admitting dx. Denies need to stay wants to sign AMA paperwork son will transport home. Electronically signed by Leonor Morataya RN on 11/3/2023 at 5:49 PM

## 2023-11-03 NOTE — FLOWSHEET NOTE
11/3/23 @ 0805 Notified Dr. Panchito Hackett of Cardiology consult via [facility]    11/3/23 @ [phone] Notified KAZ Rivero of Neurology consult via [facility]

## 2023-11-03 NOTE — CONSULTS
CONTRAST    Result Date: 11/3/2023  EXAMINATION: CT OF THE HEAD WITHOUT CONTRAST  11/2/2023 9:16 pm TECHNIQUE: CT of the head was performed without the administration of intravenous contrast. Automated exposure control, iterative reconstruction, and/or weight based adjustment of the mA/kV was utilized to reduce the radiation dose to as low as reasonably achievable. COMPARISON: None. HISTORY: ORDERING SYSTEM PROVIDED HISTORY: fall with confusion TECHNOLOGIST PROVIDED HISTORY: Reason for exam:->fall with confusion Has a \"code stroke\" or \"stroke alert\" been called? ->No Decision Support Exception - unselect if not a suspected or confirmed emergency medical condition->Emergency Medical Condition (MA) What reading provider will be dictating this exam?->CRC FINDINGS: BRAIN/VENTRICLES: There is no acute intracranial hemorrhage, mass effect or midline shift. No abnormal extra-axial fluid collection. The gray-white differentiation is maintained without evidence of an acute infarct. There is no evidence of hydrocephalus. The ventricles, cisterns and sulci are prominent consistent with atrophy. There is decreased attenuation within the periventricular white matter consistent with periventricular leukomalacia. There is ectasia of the vertebral artery as well as the intracranial segments of the right and left internal carotid arteries. No focal aneurysm is noted. ORBITS: The visualized portion of the orbits demonstrate no acute abnormality. SINUSES: The visualized paranasal sinuses and mastoid air cells demonstrate no acute abnormality. SOFT TISSUES/SKULL:  No acute abnormality of the visualized skull or soft tissues. 1. There is no acute intracranial abnormality. Specifically, there is no intracranial hemorrhage.  2. Atrophy and periventricular leukomalacia,       Lab Results   Component Value Date/Time    WBC 7.1 11/03/2023 06:42 AM    RBC 4.57 11/03/2023 06:42 AM    HGB 14.6 11/03/2023 06:42 AM    HCT 42.3 11/03/2023 Carlos Tomas, American Board of Psychiatry & Neurology  Board Certified in Vascular Neurology  Board Certified in Neuromuscular Medicine  Certified in [address]:

## 2023-11-03 NOTE — CONSULTS
Inpatient consult to Cardiology  Consult performed by: Trevor Martin MD  Consult ordered by: Tanna Magana DO          Patient Name: Halima Blanco Date: 2023  8:12 PM  MR #: [id number]  : 1940    Attending Physician: Stephon Rick DO  Reason for consult: Syncope    History of Presenting Illness:      Germaine Nageotte is a 78 y.o. male on hospital day 0 with a history of . History Obtained From:  patient, electronic medical record    Pt's son heard pt fall. Pt was down for ~ 10 min per son. Pt was breathing fine but was not able to communicate. No SZ activity noted per son. By the time EMS arrived pt was more alert. ECG SR 60 LBBB ( which is chronic). Telemetry SR with PVCs. HS Trop WNL. This AM pt is completely AO3. He has no new pain from the fall. He does not know the circumstances of the fall but states his son told him he fell in the living room. He is otherwise very active man. In August he tripped over a side walk and fell but no significant injury. No other falls per pt. He is followed at [facility] Ao aneurysm 4.9 cm. 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 Last CTA chest 23 confirms Aneurysm 4.9 cm.   Also noted was Right Lung mass - he is seeing Pulmonary at [facility]    Recent Echo at [facility] EF 69%   History:      EKG:  Past Medical History:   Diagnosis Date    Mini stroke ([address]) 3-1993    Mixed hyperlipidemia 3/22/2017    Prostate cancer ([address]) 2015    Unspecified essential hypertension 10/16/2013     Past Surgical History:   Procedure Laterality Date    CARPAL TUNNEL RELEASE Left 14    [facility]    PROSTATE BIOPSY         Family History  Family History   Problem Relation Age of Onset    Stroke Mother     Heart Disease Father     Cancer Brother         prostate     [] Unable to obtain due to ventilated and/ or neurologic status    Social History     Socioeconomic History    Marital status: tongue, salivary glands, and vocal cords. Lymph nodes: No tracer avid lymph nodes. Thyroid: No tracer avid lesion. CHEST: Lungs & Airways: *  2.5 x 1.5 cm lateral right upper lobe part solid nodule with heterogeneous low level tracer activity particularly with the solid component that measures roughly 1.5 cm (CT image 118; max SUV 2.3), previously 2.1 x 1.7 cm in 2019 with increased density since that time. Note, pleural tags and tethering of the major fissure. *  No other tracer avid consolidation, mass, or nodule. *  Please note, PET/CT is often not sensitive for pulmonary nodules less than than 8 mm. *  Few subcentimeter calcified granulomas. *  Mild bibasilar atelectasis/scarring. Pleura: No tracer avid lesion. No pleural effusion. Mediastinum & Lymph Nodes: No tracer avid mass or lymph nodes. Cardiovascular: *  Physiologic blood pool and myocardial activity. *  Stable thoracic aortic dilation measuring 4.5 cm in the mid ascending segment. Chest Wall:  No tracer avid lesion. ABDOMEN AND PELVIS: Physiologic activity in the GI and  tracts. Liver: No tracer avid lesion. Biliary: Unremarkable gallbladder. Spleen: No tracer avid lesion. No splenomegaly. Pancreas: No tracer avid lesion. Adrenals: No tracer avid lesion. Kidneys: No focal tracer avid lesion. Left-sided photopenic cyst.  Right lower pole atrophy. GI Tract: No focal tracer avid lesion. No dilated bowel. Lymph Nodes: No tracer avid lymph nodes. Mesentery/Peritoneum: No tracer avid lesion. No ascites. Retroperitoneum: No tracer avid lesion. Vasculature: *  Atherosclerotic calcifications without an abdominal aortic aneurysm. *  Multiple similar peripherally calcified renal artery saccular aneurysms since 2021. *  Additional grossly stable fusiform aneurysmal dilation of the mid renal artery measuring 1.9 cm. Pelvis: No tracer avid lesion. Fiducial markers in the prostate. Abdominal Wall: No tracer avid lesion.  BONES AND EXTREMITIES: No tracer avid

## 2023-11-03 NOTE — ED NOTES
Report called to [place] to [place] at this time. All questions answered at this time. Belongings charted at this time. Waiting for tele monitor at this time.       Seema Palomino RN  11/03/23 0003

## 2023-11-03 NOTE — FLOWSHEET NOTE
AMA paperwork signed by pt. Hospitalist and consulting doctors made aware. Peripheral IV removed and all belongings sent with pt. Son to transport home via car. Electronically signed by Dylan Frederick RN on 11/3/2023 at 5:54 PM

## 2023-11-03 NOTE — H&P
[facility]    HISTORY AND PHYSICAL EXAM    PATIENT NAME:  Gabi Nash    MRN:  [id number]  SERVICE DATE:  11/2/2023   SERVICE TIME:  11:40 PM    Primary Care Physician: Vikas Owen MD     SUBJECTIVE  CHIEF COMPLAINT: Syncope and collapse at home, unresponsive episode    HPI:  Gabi Nash is a 78 y.o. male who presents with above complaints. Patient is an 76-year-old male presenting via EMS from home after episode of syncope and collapse. Lives with son at home. Son heard noise from the other room, went into the room to find patient collapsed on the ground staring at the ceiling, breathing but not verbally responding to son at all for a period of approximately 10 minutes. No reported seizure-like activity. No similar prior episodes. Patient has been reporting some episodes of lightheadedness for the past few weeks, reportedly worse when he is first sitting and standing from a laying or seated position, but also shortly after laying down flat in the bed. EMS arrived several minutes after the onset of patient's fall, stated that he had improved to the point of being ANO x1 when they were there, and then was ANO x3 at his normal baseline by the time he presented to the ED. He had no recollection of the fall. GCS 15 in the ED. Nonfocal neurologic examination. He is only on low-dose amlodipine at home for blood pressure medication, with no recent changes in dose. Vital signs are stable on room air in the ED. Laboratory work-up reassuring and unremarkable. CT of the head with no acute changes. EKG demonstrating normal sinus rhythm at 60 bpm with left bundle branch block, with no prior EKGs for comparison. No ACS symptoms during admission concerning for any acute cardiac event. Patient received 1 L normal saline empirically in the ED. Hospitalist service was consulted for admission. On examination, patient confirms objective portions of the above.   States he has been cardiology consultations  Nursing bedside swallow screen prior to diet  Serial neurochecks overnight  PT/OT evaluate and treat  Lovenox for DVT prophylaxis  Continue/hold other home medications as ordered after medication history completed      Plan of care discussed with: patient    SIGNATURE: Samra Hutton DO  DATE: November 2, 2023  TIME: 11:40 PM

## 2023-11-03 NOTE — FLOWSHEET NOTE
Assessment completed. VSS. Alert et oriented x4. Up to bathroom with standby assist. Denies pain or discomfort. No c/o dizziness  at this time. Son at bedside. Juan light within reach. Electronically signed by Dulce Garcia RN on 11/3/2023 at 12:37 PM

## 2023-11-03 NOTE — ED PROVIDER NOTES
[facility]  eMERGENCY dEPARTMENT eNCOUnter      Pt Name: Lanette Deras  MRN: [id number]  [address] [facility] [place] 1940  Date of evaluation: 11/2/2023  Provider: Carolyn Samayoa MD    CHIEF COMPLAINT       Chief Complaint   Patient presents with    Fall     Patients family states they heard patient fall, patient was A/O x 1 after fall. Patient now A/O x3 and has no complaints at this time. HISTORY OF PRESENT ILLNESS   (Location/Symptom, Timing/Onset,Context/Setting, Quality, Duration, Modifying Factors, Severity)  Note limiting factors. Lanette Deras is a 78 y.o. male who presents to the emergency department following a syncopal episode or fall at home. Patient son gives the history that he was in the other room and heard his dad fall went into the room dad was looking up at the ceiling and kind of contorted. He states father was awake and breathing with eyes open but not answering any questions. There was at least 10 minutes from the point he called EMS till EMS arrived that the patient was not responding to any verbal cues or following commands. Paramedics were able to get the patient to talk and he was initially alert and oriented x1 progressively improving in route to alert and oriented x3. After arrival here the patient was at his baseline mental status and according to son has appropriate behavior. And my exam was normal with a Glascow of 15. No obvious facial injuries. Obvious head injuries. No deformities. Moves all extremities. Normal day prior to the episode but the patient has been complaining of lightheaded symptoms off and on for the past couple weeks. No blood thinners    HPI    NursingNotes were reviewed. REVIEW OF SYSTEMS    (2-9 systems for level 4, 10 or more for level 5)     Review of Systems   Constitutional:  Negative for chills and diaphoresis. HENT:  Negative for congestion, ear pain, mouth sores and sore throat. Eyes:  Negative for photophobia and discharge.

## 2023-12-08 ENCOUNTER — TELEPHONE (OUTPATIENT)
Dept: PRIMARY CARE | Facility: CLINIC | Age: 83
End: 2023-12-08
Payer: MEDICARE

## 2023-12-08 NOTE — TELEPHONE ENCOUNTER
Lmom asking patient to return call to reschedule upcoming appointment with provider due to provider being out of office. Please reschedule patient if/when they return call.

## 2023-12-13 NOTE — TELEPHONE ENCOUNTER
2nd attempt, spoke with spouse stated pt would call back to reschedule, cancelled appt due to provider not in office

## 2023-12-29 DIAGNOSIS — L85.3 DRY SKIN: ICD-10-CM

## 2023-12-29 RX ORDER — CHLORHEXIDINE GLUCONATE ORAL RINSE 1.2 MG/ML
15 SOLUTION DENTAL AS NEEDED
Qty: 473 ML | Refills: 0 | Status: SHIPPED | OUTPATIENT
Start: 2023-12-29 | End: 2024-03-05 | Stop reason: SDUPTHER

## 2024-03-04 PROBLEM — Z00.00 ENCOUNTER FOR SUBSEQUENT ANNUAL WELLNESS VISIT (AWV) IN MEDICARE PATIENT: Status: ACTIVE | Noted: 2024-03-04

## 2024-03-05 DIAGNOSIS — L85.3 DRY SKIN: Primary | ICD-10-CM

## 2024-03-05 NOTE — TELEPHONE ENCOUNTER
Rx Refill Request Telephone Encounter    Name:  Nayan Tesfaye  :  [id number]  Medication Name:  chlorhexidine (Peridex) 0.12 % solution     Specific Pharmacy location:  [facility] [address]  Date of last appointment:  23  Date of next appointment:  3/13/24  Best number to reach patient:  [phone]

## 2024-03-06 RX ORDER — CHLORHEXIDINE GLUCONATE ORAL RINSE 1.2 MG/ML
15 SOLUTION DENTAL AS NEEDED
Qty: 473 ML | Refills: 0 | Status: SHIPPED | OUTPATIENT
Start: 2024-03-06 | End: 2024-03-22 | Stop reason: SDUPTHER

## 2024-03-11 ENCOUNTER — APPOINTMENT (OUTPATIENT)
Dept: PRIMARY CARE | Facility: CLINIC | Age: 84
End: 2024-03-11
Payer: MEDICARE

## 2024-03-21 NOTE — PROGRESS NOTES
"Subjective   Patient ID: Nayan Tesfaye is an 83 y.o. male who is presenting today for a Medicare Annual Wellness Visit Subsequent .    The patients living will is active. His POA is wife, back-up POA is son  Norman.  The patients current code status is FULL CODE. The patient does not want to linger on machines. .    Encounter for subsequent AWV: Medicare wellness visit done, patient is in satisfactory living circumstances where the patient's needs are met.  This patient is advised to develop or update their living will and provide us a copy for the chart.    Hypertension: The patient is here for follow-up of elevated blood pressure. He is adherent to a low salt diet. Blood pressure is well controlled at home. No new myalgias or GI upset on diet control.    Vitamin D Deficiency: The patient presents today for a follow up of Vitamin D deficiency. Patient denies any side effects to the medications.     Prostate Cancer: In remission and follows oncology. Will test PSA with labs.     The patient denies having the following symptoms: chest pain, chest pressure, fever, chills, N/V/D, constipation, dizziness, headaches, SOB.    Objective   Vitals:  /60 (BP Location: Right arm, Patient Position: Sitting)   Pulse 76   Temp 36.9 °C (98.4 °F) (Temporal)   Resp 18   Ht 1.778 m (5' 10\")   Wt 65.8 kg (145 lb)   SpO2 98%   BMI 20.81 kg/m²       Physical Exam  Vitals reviewed.   Constitutional:       Appearance: Normal appearance.   Neck:      Vascular: No carotid bruit.   Cardiovascular:      Rate and Rhythm: Normal rate and regular rhythm.      Pulses: Normal pulses.      Heart sounds: Normal heart sounds.   Pulmonary:      Effort: Pulmonary effort is normal. No respiratory distress.      Breath sounds: Normal breath sounds. No wheezing.   Abdominal:      General: There is no distension.      Palpations: Abdomen is soft. There is no mass.      Tenderness: There is no abdominal tenderness. There is no right CVA " tenderness, left CVA tenderness, guarding or rebound.   Musculoskeletal:      Cervical back: Normal range of motion and neck supple. No rigidity.      Right lower leg: No edema.      Left lower leg: No edema.   Lymphadenopathy:      Cervical: No cervical adenopathy.   Neurological:      Mental Status: He is alert.       Labs active- future.     Assessment/Plan   Problem List Items Addressed This Visit       HTN (hypertension), benign    Current Assessment & Plan     Is clinically stable so we will continue with current medications and lab work to confirm status ordered.          Relevant Medications    amLODIPine (Norvasc) 10 mg tablet    Other Relevant Orders    CBC and Auto Differential    Comprehensive Metabolic Panel    Magnesium    Follow Up In [facility] - PCP - Established    CBC and Auto Differential    Comprehensive Metabolic Panel    Lipid Panel    Magnesium    Malignant neoplasm of prostate (CMS/HCC)    Current Assessment & Plan     Is clinically stable so we will continue with current medications and lab work to confirm status ordered.          Relevant Orders    PSA    Prostate Specific Antigen    Vitamin D deficiency    Current Assessment & Plan     Is clinically stable so we will continue with current medications and lab work to confirm status ordered.           Relevant Orders    Vitamin D 25-Hydroxy,Total (for eval of Vitamin D levels)    Aneurysm of ascending aorta without rupture (CMS/HCC)    Current Assessment & Plan      Is stable, continue with current treatment.          Encounter for subsequent annual wellness visit (AWV) in Medicare patient - Primary    Current Assessment & Plan     Medicare wellness visit done, patient is in satisfactory living circumstances where the patient's needs are met.  This patient is advised to develop or update their living will and provide us a copy for the chart.           Relevant Orders    Lipid Panel    Full code (Completed)     Other Visit  Diagnoses       Dry skin        Relevant Medications    chlorhexidine (Peridex) 0.12 % solution    Routine general medical examination at health care facility                Follow up in: 6 month(s) or sooner if needed with labs prior.    Scribe Attestation  By signing my name below, I, Anurag Dobbins   attest that this documentation has been prepared under the direction and in the presence of Christian Paiz MD.

## 2024-03-22 ENCOUNTER — LAB (OUTPATIENT)
Dept: LAB | Facility: LAB | Age: 84
End: 2024-03-22
Payer: MEDICARE

## 2024-03-22 ENCOUNTER — OFFICE VISIT (OUTPATIENT)
Dept: PRIMARY CARE | Facility: CLINIC | Age: 84
End: 2024-03-22
Payer: MEDICARE

## 2024-03-22 VITALS
WEIGHT: 145 LBS | SYSTOLIC BLOOD PRESSURE: 108 MMHG | OXYGEN SATURATION: 98 % | DIASTOLIC BLOOD PRESSURE: 60 MMHG | BODY MASS INDEX: 20.76 KG/M2 | RESPIRATION RATE: 18 BRPM | TEMPERATURE: 98.4 F | HEART RATE: 76 BPM | HEIGHT: 70 IN

## 2024-03-22 DIAGNOSIS — E55.9 VITAMIN D DEFICIENCY: ICD-10-CM

## 2024-03-22 DIAGNOSIS — Z00.00 ENCOUNTER FOR SUBSEQUENT ANNUAL WELLNESS VISIT (AWV) IN MEDICARE PATIENT: ICD-10-CM

## 2024-03-22 DIAGNOSIS — I71.21 ANEURYSM OF ASCENDING AORTA WITHOUT RUPTURE (CMS-HCC): ICD-10-CM

## 2024-03-22 DIAGNOSIS — C61 MALIGNANT NEOPLASM OF PROSTATE (MULTI): ICD-10-CM

## 2024-03-22 DIAGNOSIS — L85.3 DRY SKIN: ICD-10-CM

## 2024-03-22 DIAGNOSIS — I10 HTN (HYPERTENSION), BENIGN: ICD-10-CM

## 2024-03-22 DIAGNOSIS — Z00.00 ENCOUNTER FOR SUBSEQUENT ANNUAL WELLNESS VISIT (AWV) IN MEDICARE PATIENT: Primary | ICD-10-CM

## 2024-03-22 DIAGNOSIS — Z00.00 ROUTINE GENERAL MEDICAL EXAMINATION AT HEALTH CARE FACILITY: ICD-10-CM

## 2024-03-22 LAB
25(OH)D3 SERPL-MCNC: 30 NG/ML (ref 30–100)
ALBUMIN SERPL BCP-MCNC: 4.3 G/DL (ref 3.4–5)
ALP SERPL-CCNC: 108 U/L (ref 33–136)
ALT SERPL W P-5'-P-CCNC: 10 U/L (ref 10–52)
ANION GAP SERPL CALC-SCNC: 12 MMOL/L (ref 10–20)
AST SERPL W P-5'-P-CCNC: 16 U/L (ref 9–39)
BASOPHILS # BLD AUTO: 0.04 X10*3/UL (ref 0–0.1)
BASOPHILS NFR BLD AUTO: 0.6 %
BILIRUB SERPL-MCNC: 0.8 MG/DL (ref 0–1.2)
BUN SERPL-MCNC: 24 MG/DL (ref 6–23)
CALCIUM SERPL-MCNC: 9.7 MG/DL (ref 8.6–10.3)
CHLORIDE SERPL-SCNC: 104 MMOL/L (ref 98–107)
CHOLEST SERPL-MCNC: 170 MG/DL (ref 0–199)
CHOLESTEROL/HDL RATIO: 2.2
CO2 SERPL-SCNC: 27 MMOL/L (ref 21–32)
CREAT SERPL-MCNC: 0.96 MG/DL (ref 0.5–1.3)
EGFRCR SERPLBLD CKD-EPI 2021: 77 ML/MIN/1.73M*2
EOSINOPHIL # BLD AUTO: 0.11 X10*3/UL (ref 0–0.4)
EOSINOPHIL NFR BLD AUTO: 1.8 %
ERYTHROCYTE [DISTWIDTH] IN BLOOD BY AUTOMATED COUNT: 12.3 % (ref 11.5–14.5)
GLUCOSE SERPL-MCNC: 99 MG/DL (ref 74–99)
HCT VFR BLD AUTO: 43.7 % (ref 41–52)
HDLC SERPL-MCNC: 78.8 MG/DL
HGB BLD-MCNC: 14.9 G/DL (ref 13.5–17.5)
IMM GRANULOCYTES # BLD AUTO: 0.02 X10*3/UL (ref 0–0.5)
IMM GRANULOCYTES NFR BLD AUTO: 0.3 % (ref 0–0.9)
LDLC SERPL CALC-MCNC: 69 MG/DL
LYMPHOCYTES # BLD AUTO: 1.2 X10*3/UL (ref 0.8–3)
LYMPHOCYTES NFR BLD AUTO: 19.1 %
MAGNESIUM SERPL-MCNC: 2.1 MG/DL (ref 1.6–2.4)
MCH RBC QN AUTO: 32.3 PG (ref 26–34)
MCHC RBC AUTO-ENTMCNC: 34.1 G/DL (ref 32–36)
MCV RBC AUTO: 95 FL (ref 80–100)
MONOCYTES # BLD AUTO: 0.75 X10*3/UL (ref 0.05–0.8)
MONOCYTES NFR BLD AUTO: 12 %
NEUTROPHILS # BLD AUTO: 4.15 X10*3/UL (ref 1.6–5.5)
NEUTROPHILS NFR BLD AUTO: 66.2 %
NON HDL CHOLESTEROL: 91 MG/DL (ref 0–149)
NRBC BLD-RTO: 0 /100 WBCS (ref 0–0)
PLATELET # BLD AUTO: 204 X10*3/UL (ref 150–450)
POTASSIUM SERPL-SCNC: 3.6 MMOL/L (ref 3.5–5.3)
PROT SERPL-MCNC: 7.5 G/DL (ref 6.4–8.2)
PSA SERPL-MCNC: 5.13 NG/ML
RBC # BLD AUTO: 4.62 X10*6/UL (ref 4.5–5.9)
SODIUM SERPL-SCNC: 139 MMOL/L (ref 136–145)
TRIGL SERPL-MCNC: 111 MG/DL (ref 0–149)
VLDL: 22 MG/DL (ref 0–40)
WBC # BLD AUTO: 6.3 X10*3/UL (ref 4.4–11.3)

## 2024-03-22 PROCEDURE — G0439 PPPS, SUBSEQ VISIT: HCPCS | Performed by: FAMILY MEDICINE

## 2024-03-22 PROCEDURE — 1158F ADVNC CARE PLAN TLK DOCD: CPT | Performed by: FAMILY MEDICINE

## 2024-03-22 PROCEDURE — 82306 VITAMIN D 25 HYDROXY: CPT

## 2024-03-22 PROCEDURE — 85025 COMPLETE CBC W/AUTO DIFF WBC: CPT

## 2024-03-22 PROCEDURE — 1036F TOBACCO NON-USER: CPT | Performed by: FAMILY MEDICINE

## 2024-03-22 PROCEDURE — 3078F DIAST BP <80 MM HG: CPT | Performed by: FAMILY MEDICINE

## 2024-03-22 PROCEDURE — 83735 ASSAY OF MAGNESIUM: CPT

## 2024-03-22 PROCEDURE — 1160F RVW MEDS BY RX/DR IN RCRD: CPT | Performed by: FAMILY MEDICINE

## 2024-03-22 PROCEDURE — 3074F SYST BP LT 130 MM HG: CPT | Performed by: FAMILY MEDICINE

## 2024-03-22 PROCEDURE — 80061 LIPID PANEL: CPT

## 2024-03-22 PROCEDURE — 99214 OFFICE O/P EST MOD 30 MIN: CPT | Performed by: FAMILY MEDICINE

## 2024-03-22 PROCEDURE — 1170F FXNL STATUS ASSESSED: CPT | Performed by: FAMILY MEDICINE

## 2024-03-22 PROCEDURE — 84153 ASSAY OF PSA TOTAL: CPT

## 2024-03-22 PROCEDURE — 36415 COLL VENOUS BLD VENIPUNCTURE: CPT

## 2024-03-22 PROCEDURE — 1159F MED LIST DOCD IN RCRD: CPT | Performed by: FAMILY MEDICINE

## 2024-03-22 PROCEDURE — 1123F ACP DISCUSS/DSCN MKR DOCD: CPT | Performed by: FAMILY MEDICINE

## 2024-03-22 PROCEDURE — 80053 COMPREHEN METABOLIC PANEL: CPT

## 2024-03-22 RX ORDER — AMLODIPINE BESYLATE 10 MG/1
10 TABLET ORAL DAILY
Qty: 30 TABLET | Refills: 0 | Status: SHIPPED | OUTPATIENT
Start: 2024-03-22 | End: 2024-05-06 | Stop reason: SDUPTHER

## 2024-03-22 RX ORDER — CHLORHEXIDINE GLUCONATE ORAL RINSE 1.2 MG/ML
15 SOLUTION DENTAL AS NEEDED
Qty: 473 ML | Refills: 0 | Status: SHIPPED | OUTPATIENT
Start: 2024-03-22

## 2024-03-22 ASSESSMENT — ACTIVITIES OF DAILY LIVING (ADL)
GROCERY_SHOPPING: INDEPENDENT
DOING_HOUSEWORK: INDEPENDENT
MANAGING_FINANCES: INDEPENDENT
BATHING: INDEPENDENT
DRESSING: INDEPENDENT
TAKING_MEDICATION: INDEPENDENT

## 2024-03-22 ASSESSMENT — PATIENT HEALTH QUESTIONNAIRE - PHQ9
SUM OF ALL RESPONSES TO PHQ9 QUESTIONS 1 AND 2: 0
1. LITTLE INTEREST OR PLEASURE IN DOING THINGS: NOT AT ALL
2. FEELING DOWN, DEPRESSED OR HOPELESS: NOT AT ALL

## 2024-05-06 DIAGNOSIS — I10 HTN (HYPERTENSION), BENIGN: ICD-10-CM

## 2024-05-06 RX ORDER — AMLODIPINE BESYLATE 10 MG/1
10 TABLET ORAL DAILY
Qty: 30 TABLET | Refills: 0 | Status: SHIPPED | OUTPATIENT
Start: 2024-05-06 | End: 2024-06-11 | Stop reason: SDUPTHER

## 2024-05-06 NOTE — TELEPHONE ENCOUNTER
Rx Refill Request Telephone Encounter    Name:  Nayan Tesfaye  :  [id number]  Medication Name:  amlodipine (Norvasc) 10 mg   Specific Pharmacy location:  [facility] [address]  Date of last appointment:  3/22  Date of next appointment:    Best number to reach patient:  [phone]

## 2024-06-11 DIAGNOSIS — I10 HTN (HYPERTENSION), BENIGN: ICD-10-CM

## 2024-06-11 RX ORDER — AMLODIPINE BESYLATE 10 MG/1
10 TABLET ORAL DAILY
Qty: 90 TABLET | Refills: 0 | Status: SHIPPED | OUTPATIENT
Start: 2024-06-11

## 2024-06-11 NOTE — TELEPHONE ENCOUNTER
Rx Refill Request     Name: Nayan Tesfaye  :  1940   Medication Name:    amLODIPine (Norvasc) 10 mg tablet - Take 1 tablet (10 mg) by mouth once daily.  Specific Pharmacy location:  [facility]/ [place]  Date of last appointment:  3/22/2024   Date of next appointment:  2024   Best number to reach patient:  [phone]

## 2024-07-17 DIAGNOSIS — L85.3 DRY SKIN: ICD-10-CM

## 2024-07-17 RX ORDER — CHLORHEXIDINE GLUCONATE ORAL RINSE 1.2 MG/ML
15 SOLUTION DENTAL AS NEEDED
Qty: 473 ML | Refills: 0 | Status: SHIPPED | OUTPATIENT
Start: 2024-07-17

## 2024-07-17 NOTE — TELEPHONE ENCOUNTER
Rx Refill Request Telephone Encounter    Name:  Nayan Tesfaye  :  [id number]  Medication Name:  chlorhexidine (Peridex) 0.12 % solution     Specific Pharmacy location:  [facility] [address] [address]  Date of last appointment:  3/22/24  Date of next appointment:  24  Best number to reach patient:  [phone]

## 2024-09-05 ENCOUNTER — LAB (OUTPATIENT)
Dept: LAB | Facility: LAB | Age: 84
End: 2024-09-05
Payer: MEDICARE

## 2024-09-05 DIAGNOSIS — C61 MALIGNANT NEOPLASM OF PROSTATE (MULTI): ICD-10-CM

## 2024-09-05 DIAGNOSIS — I10 HTN (HYPERTENSION), BENIGN: ICD-10-CM

## 2024-09-05 LAB
ALBUMIN SERPL BCP-MCNC: 4.3 G/DL (ref 3.4–5)
ALP SERPL-CCNC: 86 U/L (ref 33–136)
ALT SERPL W P-5'-P-CCNC: 10 U/L (ref 10–52)
ANION GAP SERPL CALC-SCNC: 17 MMOL/L (ref 10–20)
AST SERPL W P-5'-P-CCNC: 17 U/L (ref 9–39)
BASOPHILS # BLD AUTO: 0.04 X10*3/UL (ref 0–0.1)
BASOPHILS NFR BLD AUTO: 0.6 %
BILIRUB SERPL-MCNC: 1.1 MG/DL (ref 0–1.2)
BUN SERPL-MCNC: 19 MG/DL (ref 6–23)
CALCIUM SERPL-MCNC: 9.4 MG/DL (ref 8.6–10.3)
CHLORIDE SERPL-SCNC: 103 MMOL/L (ref 98–107)
CHOLEST SERPL-MCNC: 164 MG/DL (ref 0–199)
CHOLESTEROL/HDL RATIO: 2.2
CO2 SERPL-SCNC: 23 MMOL/L (ref 21–32)
CREAT SERPL-MCNC: 0.96 MG/DL (ref 0.5–1.3)
EGFRCR SERPLBLD CKD-EPI 2021: 77 ML/MIN/1.73M*2
EOSINOPHIL # BLD AUTO: 0.2 X10*3/UL (ref 0–0.4)
EOSINOPHIL NFR BLD AUTO: 2.8 %
ERYTHROCYTE [DISTWIDTH] IN BLOOD BY AUTOMATED COUNT: 13.1 % (ref 11.5–14.5)
GLUCOSE SERPL-MCNC: 95 MG/DL (ref 74–99)
HCT VFR BLD AUTO: 45.5 % (ref 41–52)
HDLC SERPL-MCNC: 74 MG/DL
HGB BLD-MCNC: 15.2 G/DL (ref 13.5–17.5)
IMM GRANULOCYTES # BLD AUTO: 0.02 X10*3/UL (ref 0–0.5)
IMM GRANULOCYTES NFR BLD AUTO: 0.3 % (ref 0–0.9)
LDLC SERPL CALC-MCNC: 72 MG/DL
LYMPHOCYTES # BLD AUTO: 1.38 X10*3/UL (ref 0.8–3)
LYMPHOCYTES NFR BLD AUTO: 19 %
MAGNESIUM SERPL-MCNC: 2.11 MG/DL (ref 1.6–2.4)
MCH RBC QN AUTO: 32.1 PG (ref 26–34)
MCHC RBC AUTO-ENTMCNC: 33.4 G/DL (ref 32–36)
MCV RBC AUTO: 96 FL (ref 80–100)
MONOCYTES # BLD AUTO: 0.73 X10*3/UL (ref 0.05–0.8)
MONOCYTES NFR BLD AUTO: 10.1 %
NEUTROPHILS # BLD AUTO: 4.89 X10*3/UL (ref 1.6–5.5)
NEUTROPHILS NFR BLD AUTO: 67.2 %
NON HDL CHOLESTEROL: 90 MG/DL (ref 0–149)
NRBC BLD-RTO: 0 /100 WBCS (ref 0–0)
PLATELET # BLD AUTO: 192 X10*3/UL (ref 150–450)
POTASSIUM SERPL-SCNC: 4 MMOL/L (ref 3.5–5.3)
PROT SERPL-MCNC: 7.3 G/DL (ref 6.4–8.2)
PSA SERPL-MCNC: 3.22 NG/ML
RBC # BLD AUTO: 4.74 X10*6/UL (ref 4.5–5.9)
SODIUM SERPL-SCNC: 139 MMOL/L (ref 136–145)
TRIGL SERPL-MCNC: 91 MG/DL (ref 0–149)
VLDL: 18 MG/DL (ref 0–40)
WBC # BLD AUTO: 7.3 X10*3/UL (ref 4.4–11.3)

## 2024-09-05 PROCEDURE — 36415 COLL VENOUS BLD VENIPUNCTURE: CPT

## 2024-09-05 PROCEDURE — 85025 COMPLETE CBC W/AUTO DIFF WBC: CPT

## 2024-09-05 PROCEDURE — 84153 ASSAY OF PSA TOTAL: CPT

## 2024-09-05 PROCEDURE — 80061 LIPID PANEL: CPT

## 2024-09-05 PROCEDURE — 83735 ASSAY OF MAGNESIUM: CPT

## 2024-09-05 PROCEDURE — 80053 COMPREHEN METABOLIC PANEL: CPT

## 2024-09-12 ENCOUNTER — APPOINTMENT (OUTPATIENT)
Dept: PRIMARY CARE | Facility: CLINIC | Age: 84
End: 2024-09-12
Payer: MEDICARE

## 2024-09-13 ENCOUNTER — OFFICE VISIT (OUTPATIENT)
Dept: PRIMARY CARE | Facility: CLINIC | Age: 84
End: 2024-09-13
Payer: MEDICARE

## 2024-09-13 VITALS
OXYGEN SATURATION: 96 % | HEART RATE: 90 BPM | HEIGHT: 70 IN | WEIGHT: 143.8 LBS | RESPIRATION RATE: 16 BRPM | TEMPERATURE: 98.3 F | SYSTOLIC BLOOD PRESSURE: 102 MMHG | DIASTOLIC BLOOD PRESSURE: 64 MMHG | BODY MASS INDEX: 20.59 KG/M2

## 2024-09-13 DIAGNOSIS — C61 MALIGNANT NEOPLASM OF PROSTATE (MULTI): ICD-10-CM

## 2024-09-13 DIAGNOSIS — I10 HTN (HYPERTENSION), BENIGN: Primary | ICD-10-CM

## 2024-09-13 PROCEDURE — 1036F TOBACCO NON-USER: CPT | Performed by: FAMILY MEDICINE

## 2024-09-13 PROCEDURE — 1159F MED LIST DOCD IN RCRD: CPT | Performed by: FAMILY MEDICINE

## 2024-09-13 PROCEDURE — 3074F SYST BP LT 130 MM HG: CPT | Performed by: FAMILY MEDICINE

## 2024-09-13 PROCEDURE — 99214 OFFICE O/P EST MOD 30 MIN: CPT | Performed by: FAMILY MEDICINE

## 2024-09-13 PROCEDURE — 3078F DIAST BP <80 MM HG: CPT | Performed by: FAMILY MEDICINE

## 2024-09-13 PROCEDURE — 1123F ACP DISCUSS/DSCN MKR DOCD: CPT | Performed by: FAMILY MEDICINE

## 2024-09-13 RX ORDER — AMLODIPINE BESYLATE 5 MG/1
5 TABLET ORAL DAILY
Qty: 90 TABLET | Refills: 3 | Status: SHIPPED | OUTPATIENT
Start: 2024-09-13

## 2024-09-13 ASSESSMENT — PATIENT HEALTH QUESTIONNAIRE - PHQ9
SUM OF ALL RESPONSES TO PHQ9 QUESTIONS 1 AND 2: 0
2. FEELING DOWN, DEPRESSED OR HOPELESS: NOT AT ALL
1. LITTLE INTEREST OR PLEASURE IN DOING THINGS: NOT AT ALL

## 2024-09-13 ASSESSMENT — ENCOUNTER SYMPTOMS
OCCASIONAL FEELINGS OF UNSTEADINESS: 1
LOSS OF SENSATION IN FEET: 0
DEPRESSION: 0

## 2024-09-13 NOTE — ASSESSMENT & PLAN NOTE
Is well controlled, continue with current medications.  Although will reduce amlodipine to 5 mg daily from 10.

## 2024-10-13 DIAGNOSIS — L85.3 DRY SKIN: Primary | ICD-10-CM

## 2024-10-14 RX ORDER — CHLORHEXIDINE GLUCONATE ORAL RINSE 1.2 MG/ML
SOLUTION DENTAL
Qty: 473 ML | Refills: 0 | Status: SHIPPED | OUTPATIENT
Start: 2024-10-14

## 2024-10-25 ENCOUNTER — PATIENT OUTREACH (OUTPATIENT)
Dept: PRIMARY CARE | Facility: CLINIC | Age: 84
End: 2024-10-25
Payer: MEDICARE

## 2024-10-25 RX ORDER — METOPROLOL SUCCINATE 50 MG/1
50 TABLET, EXTENDED RELEASE ORAL
COMMUNITY
Start: 2024-10-24

## 2024-10-25 RX ORDER — POTASSIUM CHLORIDE 20 MEQ/1
20 TABLET, EXTENDED RELEASE ORAL
COMMUNITY
Start: 2024-10-24

## 2024-10-25 RX ORDER — ATORVASTATIN CALCIUM 80 MG/1
80 TABLET, FILM COATED ORAL
COMMUNITY
Start: 2024-10-24

## 2024-10-25 RX ORDER — FUROSEMIDE 40 MG/1
40 TABLET ORAL
COMMUNITY
Start: 2024-10-24

## 2024-10-25 NOTE — PROGRESS NOTES
Discharge Facility: [facility]  Discharge Diagnosis:  -New onset atrial fibrillation with rapid ventricular rate  -Pleural effusions  -Heart failure with reduced ejection fraction, acute exacerbation   Admission Date: 10/22/24  Discharge Date:  10/24/24 - AMA    PCP Appointment Date: 11/1/24   Specialist Appointment Date:   - recommended to have renal function panel drawn next week   - Cardiology  Hospital Encounter and Summary Linked: Yes  See discharge assessment below for further details    Medications  Medications reviewed with patient/caregiver?: Yes (see med list) (10/25/2024  1:14 PM)  Is the patient having any side effects they believe may be caused by any medication additions or changes?: No (10/25/2024  1:14 PM)  Does the patient have all medications ordered at discharge?: Yes (10/25/2024  1:14 PM)  Care Management Interventions: No intervention needed (10/25/2024  1:14 PM)  Prescription Comments: STOP: amlodipine  START: apixaban, atorvastatin, furosemide, metoprolol succinate, potassium (10/25/2024  1:14 PM)  Is the patient taking all medications as directed (includes completed medication regime)?: Yes (10/25/2024  1:14 PM)  Care Management Interventions: Provided patient education (10/25/2024  1:14 PM)    Appointments  Does the patient have a primary care provider?: Yes (10/25/2024  1:14 PM)  Care Management Interventions: Verified appointment date/time/provider (10/25/2024  1:14 PM)  Has the patient kept scheduled appointments due by today?: Yes (10/25/2024  1:14 PM)  Care Management Interventions: Advised patient to keep appointment (10/25/2024  1:14 PM)    Self Management  Has home health visited the patient within 72 hours of discharge?: Not applicable (10/25/2024  1:14 PM)  What Durable Medical Equipment (DME) was ordered?: n/a (10/25/2024  1:14 PM)    Patient Teaching  Does the patient have access to their discharge instructions?: Yes (10/25/2024  1:14 PM)  Care Management Interventions: Reviewed  instructions with patient (10/25/2024  1:14 PM)  What is the patient's perception of their health status since discharge?: Improving (10/25/2024  1:14 PM)  Is the patient/caregiver able to teach back the hierarchy of who to call/visit for symptoms/problems? PCP, Specialist, Home Health nurse, Urgent Care, ED, 911: Yes (10/25/2024  1:14 PM)  Patient/Caregiver Education Comments: Spoke with pt son/caregiver Zack (on HIPAA) who reports pt is resting at time of call. States his dad left AMA yesterday but wishes he would have stayed longer. [place] he does seem to be doing better than he was and his breathing has been okay. Reports he has been checking his oxygen and blood pressure and it has been okay. Reviewed medications. Assisted to schedule follow up with PCP. Denies further questions/concerns at this time. (10/25/2024  1:14 PM)

## 2024-11-01 ENCOUNTER — LAB (OUTPATIENT)
Dept: LAB | Facility: LAB | Age: 84
End: 2024-11-01
Payer: MEDICARE

## 2024-11-01 ENCOUNTER — APPOINTMENT (OUTPATIENT)
Dept: PRIMARY CARE | Facility: CLINIC | Age: 84
End: 2024-11-01
Payer: MEDICARE

## 2024-11-01 VITALS
WEIGHT: 148.8 LBS | OXYGEN SATURATION: 94 % | SYSTOLIC BLOOD PRESSURE: 112 MMHG | HEIGHT: 70 IN | RESPIRATION RATE: 16 BRPM | HEART RATE: 90 BPM | TEMPERATURE: 97.5 F | DIASTOLIC BLOOD PRESSURE: 66 MMHG | BODY MASS INDEX: 21.3 KG/M2

## 2024-11-01 DIAGNOSIS — I10 HTN (HYPERTENSION), BENIGN: Primary | ICD-10-CM

## 2024-11-01 DIAGNOSIS — I10 HTN (HYPERTENSION), BENIGN: ICD-10-CM

## 2024-11-01 DIAGNOSIS — D68.59 HYPERCOAGULABLE STATE (MULTI): ICD-10-CM

## 2024-11-01 DIAGNOSIS — I48.19 PERSISTENT ATRIAL FIBRILLATION (MULTI): ICD-10-CM

## 2024-11-01 DIAGNOSIS — N32.81 OVERACTIVE BLADDER: ICD-10-CM

## 2024-11-01 DIAGNOSIS — J90 PLEURAL EFFUSION: ICD-10-CM

## 2024-11-01 LAB
ANION GAP SERPL CALC-SCNC: 12 MMOL/L (ref 10–20)
BUN SERPL-MCNC: 19 MG/DL (ref 6–23)
CALCIUM SERPL-MCNC: 9.3 MG/DL (ref 8.6–10.3)
CHLORIDE SERPL-SCNC: 104 MMOL/L (ref 98–107)
CO2 SERPL-SCNC: 28 MMOL/L (ref 21–32)
CREAT SERPL-MCNC: 1.26 MG/DL (ref 0.5–1.3)
EGFRCR SERPLBLD CKD-EPI 2021: 56 ML/MIN/1.73M*2
GLUCOSE SERPL-MCNC: 82 MG/DL (ref 74–99)
POTASSIUM SERPL-SCNC: 4.2 MMOL/L (ref 3.5–5.3)
SODIUM SERPL-SCNC: 140 MMOL/L (ref 136–145)

## 2024-11-01 PROCEDURE — 36415 COLL VENOUS BLD VENIPUNCTURE: CPT

## 2024-11-01 PROCEDURE — 80048 BASIC METABOLIC PNL TOTAL CA: CPT

## 2024-11-01 RX ORDER — POTASSIUM CHLORIDE 750 MG/1
10 TABLET, FILM COATED, EXTENDED RELEASE ORAL
Qty: 90 TABLET | Refills: 3 | Status: SHIPPED | OUTPATIENT
Start: 2024-11-01

## 2024-11-01 RX ORDER — OXYBUTYNIN CHLORIDE 5 MG/1
5 TABLET, EXTENDED RELEASE ORAL DAILY
Qty: 30 TABLET | Refills: 11 | Status: SHIPPED | OUTPATIENT
Start: 2024-11-01 | End: 2025-11-01

## 2024-11-06 ENCOUNTER — PATIENT OUTREACH (OUTPATIENT)
Dept: PRIMARY CARE | Facility: CLINIC | Age: 84
End: 2024-11-06
Payer: MEDICARE

## 2024-11-06 NOTE — PROGRESS NOTES
Call regarding appt. with PCP on (11/1/24) after hospitalization.  At time of outreach call the patient son Zack feels as if their condition has (improved) since last visit. Reports pt seems to be okay but he sleeps a lot. Reports he is eating and drinking okay and his breathing has been and his oxygen and pulse have been fine.  Reviewed the PCP appointment with the pt and addressed any questions or concerns.

## 2025-01-02 ENCOUNTER — LAB REQUISITION (OUTPATIENT)
Dept: LAB | Facility: HOSPITAL | Age: 85
End: 2025-01-02
Payer: MEDICARE

## 2025-01-02 DIAGNOSIS — N39.0 URINARY TRACT INFECTION, SITE NOT SPECIFIED: ICD-10-CM

## 2025-01-02 LAB
APPEARANCE UR: ABNORMAL
BACTERIA #/AREA URNS AUTO: ABNORMAL /HPF
BILIRUB UR STRIP.AUTO-MCNC: NEGATIVE MG/DL
COLOR UR: ABNORMAL
GLUCOSE UR STRIP.AUTO-MCNC: NORMAL MG/DL
KETONES UR STRIP.AUTO-MCNC: NEGATIVE MG/DL
LEUKOCYTE ESTERASE UR QL STRIP.AUTO: ABNORMAL
NITRITE UR QL STRIP.AUTO: NEGATIVE
PH UR STRIP.AUTO: 6 [PH]
PROT UR STRIP.AUTO-MCNC: ABNORMAL MG/DL
RBC # UR STRIP.AUTO: ABNORMAL /UL
RBC #/AREA URNS AUTO: ABNORMAL /HPF
SP GR UR STRIP.AUTO: 1.01
UROBILINOGEN UR STRIP.AUTO-MCNC: NORMAL MG/DL
WBC #/AREA URNS AUTO: >50 /HPF
WBC CLUMPS #/AREA URNS AUTO: ABNORMAL /HPF

## 2025-01-02 PROCEDURE — 87086 URINE CULTURE/COLONY COUNT: CPT

## 2025-01-02 PROCEDURE — 81001 URINALYSIS AUTO W/SCOPE: CPT

## 2025-01-02 PROCEDURE — 87186 SC STD MICRODIL/AGAR DIL: CPT

## 2025-01-04 LAB — BACTERIA UR CULT: ABNORMAL

## 2025-01-08 DIAGNOSIS — L85.3 DRY SKIN: ICD-10-CM

## 2025-01-08 RX ORDER — CHLORHEXIDINE GLUCONATE ORAL RINSE 1.2 MG/ML
SOLUTION DENTAL
Qty: 473 ML | Refills: 0 | Status: SHIPPED | OUTPATIENT
Start: 2025-01-08

## 2025-01-15 DIAGNOSIS — L85.3 DRY SKIN: ICD-10-CM

## 2025-01-15 RX ORDER — CHLORHEXIDINE GLUCONATE ORAL RINSE 1.2 MG/ML
SOLUTION DENTAL
Qty: 473 ML | Refills: 0 | Status: SHIPPED | OUTPATIENT
Start: 2025-01-15

## 2025-01-31 ENCOUNTER — LAB REQUISITION (OUTPATIENT)
Dept: LAB | Facility: HOSPITAL | Age: 85
End: 2025-01-31
Payer: MEDICARE

## 2025-01-31 DIAGNOSIS — R31.9 HEMATURIA, UNSPECIFIED: ICD-10-CM

## 2025-01-31 LAB
APPEARANCE UR: ABNORMAL
BILIRUB UR STRIP.AUTO-MCNC: NEGATIVE MG/DL
COLOR UR: ABNORMAL
GLUCOSE UR STRIP.AUTO-MCNC: NORMAL MG/DL
KETONES UR STRIP.AUTO-MCNC: NEGATIVE MG/DL
LEUKOCYTE ESTERASE UR QL STRIP.AUTO: ABNORMAL
MUCOUS THREADS #/AREA URNS AUTO: ABNORMAL /LPF
NITRITE UR QL STRIP.AUTO: NEGATIVE
PH UR STRIP.AUTO: 7 [PH]
PROT UR STRIP.AUTO-MCNC: ABNORMAL MG/DL
RBC # UR STRIP.AUTO: ABNORMAL /UL
RBC #/AREA URNS AUTO: >20 /HPF
SP GR UR STRIP.AUTO: 1.01
UROBILINOGEN UR STRIP.AUTO-MCNC: NORMAL MG/DL
WBC #/AREA URNS AUTO: ABNORMAL /HPF
WBC CLUMPS #/AREA URNS AUTO: ABNORMAL /HPF

## 2025-01-31 PROCEDURE — 81001 URINALYSIS AUTO W/SCOPE: CPT

## 2025-01-31 PROCEDURE — 87086 URINE CULTURE/COLONY COUNT: CPT | Mod: OUT,ELYLAB | Performed by: NURSE PRACTITIONER

## 2025-01-31 PROCEDURE — 87086 URINE CULTURE/COLONY COUNT: CPT

## 2025-01-31 PROCEDURE — 81003 URINALYSIS AUTO W/O SCOPE: CPT | Mod: OUT | Performed by: NURSE PRACTITIONER

## 2025-02-01 LAB — HOLD SPECIMEN: NORMAL

## 2025-02-02 LAB
BACTERIA UR CULT: NO GROWTH
BACTERIA UR CULT: NO GROWTH

## 2025-03-11 LAB
ALBUMIN SERPL-MCNC: 4.2 G/DL (ref 3.6–5.1)
ALP SERPL-CCNC: 91 U/L (ref 35–144)
ALT SERPL-CCNC: 8 U/L (ref 9–46)
ANION GAP SERPL CALCULATED.4IONS-SCNC: 11 MMOL/L (CALC) (ref 7–17)
AST SERPL-CCNC: 14 U/L (ref 10–35)
BASOPHILS # BLD AUTO: 20 CELLS/UL (ref 0–200)
BASOPHILS NFR BLD AUTO: 0.3 %
BILIRUB SERPL-MCNC: 0.7 MG/DL (ref 0.2–1.2)
BUN SERPL-MCNC: 22 MG/DL (ref 7–25)
CALCIUM SERPL-MCNC: 9.4 MG/DL (ref 8.6–10.3)
CHLORIDE SERPL-SCNC: 102 MMOL/L (ref 98–110)
CHOLEST SERPL-MCNC: 150 MG/DL
CHOLEST/HDLC SERPL: 2.1 (CALC)
CO2 SERPL-SCNC: 29 MMOL/L (ref 20–32)
CREAT SERPL-MCNC: 1.24 MG/DL (ref 0.7–1.22)
EGFRCR SERPLBLD CKD-EPI 2021: 57 ML/MIN/1.73M2
EOSINOPHIL # BLD AUTO: 150 CELLS/UL (ref 15–500)
EOSINOPHIL NFR BLD AUTO: 2.2 %
ERYTHROCYTE [DISTWIDTH] IN BLOOD BY AUTOMATED COUNT: 13.2 % (ref 11–15)
GLUCOSE SERPL-MCNC: 95 MG/DL (ref 65–99)
HCT VFR BLD AUTO: 44.7 % (ref 38.5–50)
HDLC SERPL-MCNC: 73 MG/DL
HGB BLD-MCNC: 15 G/DL (ref 13.2–17.1)
LDLC SERPL CALC-MCNC: 62 MG/DL (CALC)
LYMPHOCYTES # BLD AUTO: 1367 CELLS/UL (ref 850–3900)
LYMPHOCYTES NFR BLD AUTO: 20.1 %
MAGNESIUM SERPL-MCNC: 2.1 MG/DL (ref 1.5–2.5)
MCH RBC QN AUTO: 33.8 PG (ref 27–33)
MCHC RBC AUTO-ENTMCNC: 33.6 G/DL (ref 32–36)
MCV RBC AUTO: 100.7 FL (ref 80–100)
MONOCYTES # BLD AUTO: 782 CELLS/UL (ref 200–950)
MONOCYTES NFR BLD AUTO: 11.5 %
NEUTROPHILS # BLD AUTO: 4481 CELLS/UL (ref 1500–7800)
NEUTROPHILS NFR BLD AUTO: 65.9 %
NONHDLC SERPL-MCNC: 77 MG/DL (CALC)
PLATELET # BLD AUTO: 204 THOUSAND/UL (ref 140–400)
PMV BLD REES-ECKER: 10.3 FL (ref 7.5–12.5)
POTASSIUM SERPL-SCNC: 4.3 MMOL/L (ref 3.5–5.3)
PROT SERPL-MCNC: 7.3 G/DL (ref 6.1–8.1)
PSA SERPL-MCNC: 16.74 NG/ML
RBC # BLD AUTO: 4.44 MILLION/UL (ref 4.2–5.8)
SODIUM SERPL-SCNC: 142 MMOL/L (ref 135–146)
TRIGL SERPL-MCNC: 70 MG/DL
WBC # BLD AUTO: 6.8 THOUSAND/UL (ref 3.8–10.8)

## 2025-03-13 ENCOUNTER — APPOINTMENT (OUTPATIENT)
Dept: PRIMARY CARE | Facility: CLINIC | Age: 85
End: 2025-03-13
Payer: MEDICARE

## 2025-03-13 VITALS
RESPIRATION RATE: 12 BRPM | BODY MASS INDEX: 19.15 KG/M2 | HEART RATE: 60 BPM | DIASTOLIC BLOOD PRESSURE: 76 MMHG | WEIGHT: 133.8 LBS | SYSTOLIC BLOOD PRESSURE: 108 MMHG | TEMPERATURE: 98.2 F | OXYGEN SATURATION: 97 % | HEIGHT: 70 IN

## 2025-03-13 DIAGNOSIS — I48.19 PERSISTENT ATRIAL FIBRILLATION (MULTI): ICD-10-CM

## 2025-03-13 DIAGNOSIS — I50.9 CHRONIC HEART FAILURE, UNSPECIFIED HEART FAILURE TYPE: ICD-10-CM

## 2025-03-13 DIAGNOSIS — Z00.00 ENCOUNTER FOR SUBSEQUENT ANNUAL WELLNESS VISIT (AWV) IN MEDICARE PATIENT: ICD-10-CM

## 2025-03-13 DIAGNOSIS — I10 HTN (HYPERTENSION), BENIGN: ICD-10-CM

## 2025-03-13 DIAGNOSIS — C61 PROSTATE CANCER (MULTI): ICD-10-CM

## 2025-03-13 DIAGNOSIS — Z00.00 ROUTINE GENERAL MEDICAL EXAMINATION AT HEALTH CARE FACILITY: Primary | ICD-10-CM

## 2025-03-13 PROCEDURE — 3074F SYST BP LT 130 MM HG: CPT | Performed by: FAMILY MEDICINE

## 2025-03-13 PROCEDURE — 1170F FXNL STATUS ASSESSED: CPT | Performed by: FAMILY MEDICINE

## 2025-03-13 PROCEDURE — G0439 PPPS, SUBSEQ VISIT: HCPCS | Performed by: FAMILY MEDICINE

## 2025-03-13 PROCEDURE — 99214 OFFICE O/P EST MOD 30 MIN: CPT | Performed by: FAMILY MEDICINE

## 2025-03-13 PROCEDURE — 1123F ACP DISCUSS/DSCN MKR DOCD: CPT | Performed by: FAMILY MEDICINE

## 2025-03-13 PROCEDURE — 1036F TOBACCO NON-USER: CPT | Performed by: FAMILY MEDICINE

## 2025-03-13 PROCEDURE — 1159F MED LIST DOCD IN RCRD: CPT | Performed by: FAMILY MEDICINE

## 2025-03-13 PROCEDURE — 1160F RVW MEDS BY RX/DR IN RCRD: CPT | Performed by: FAMILY MEDICINE

## 2025-03-13 PROCEDURE — 3078F DIAST BP <80 MM HG: CPT | Performed by: FAMILY MEDICINE

## 2025-03-13 PROCEDURE — 1158F ADVNC CARE PLAN TLK DOCD: CPT | Performed by: FAMILY MEDICINE

## 2025-03-13 RX ORDER — METOPROLOL TARTRATE 25 MG/1
25 TABLET, FILM COATED ORAL DAILY
COMMUNITY
Start: 2024-12-05

## 2025-03-13 RX ORDER — SPIRONOLACTONE 25 MG/1
12.5 TABLET ORAL
COMMUNITY
Start: 2024-11-23

## 2025-03-13 RX ORDER — DIGOXIN 0.06 MG/1
62.5 TABLET ORAL DAILY
COMMUNITY

## 2025-03-13 ASSESSMENT — PATIENT HEALTH QUESTIONNAIRE - PHQ9
1. LITTLE INTEREST OR PLEASURE IN DOING THINGS: NOT AT ALL
SUM OF ALL RESPONSES TO PHQ9 QUESTIONS 1 AND 2: 0
2. FEELING DOWN, DEPRESSED OR HOPELESS: NOT AT ALL

## 2025-03-13 ASSESSMENT — ENCOUNTER SYMPTOMS
DEPRESSION: 0
OCCASIONAL FEELINGS OF UNSTEADINESS: 0
LOSS OF SENSATION IN FEET: 0

## 2025-03-13 ASSESSMENT — ACTIVITIES OF DAILY LIVING (ADL)
BATHING: INDEPENDENT
GROCERY_SHOPPING: TOTAL CARE
DOING_HOUSEWORK: TOTAL CARE
MANAGING_FINANCES: TOTAL CARE
TAKING_MEDICATION: TOTAL CARE
DRESSING: INDEPENDENT

## 2025-03-13 NOTE — PROGRESS NOTES
Subjective   Patient ID: Nayan Tesfaye is a 84 y.o. male who presents for Medicare Annual Wellness Visit Subsequent; Hypertension; Prostate Cancer; Heart failure, unspecified; and Unspecified atrial fibrillation.    Hypertension: The patient is here for an evaluation of elevated blood pressure. The patient is trying to follow a low-salt diet. He is adherent to a low salt diet. Blood pressure is well controlled at home. The patient is compliant with medications. Patient denies any side effects to the medications.     Atrial Fibrillation  The patient presents for an evaluation of Atrial Fibrillation.The patient is compliant with medications. Patient denies any side effects to the medications.     The patient is anticoagulated on Eliquis 5mg.    CHF: The patient is presenting today for evaluation of congestive heart failure. The patient is compliant with medications. Patient denies any side effects to the medications.     Chronic Renal Impairment, Stage 3A: This patient has stage IIIA renal insufficiency which has been stable. Patient is encouraged to continue water intake. Patient is encouraged to remain very well hydrated. Son states he has at least 3-4 glasses of water a day.     Prostate Cancer: Patients recent PSA on 03/10/2025 was 16.74. Patient last saw Dr. Cedillo in August, 2024 when he was receiving lupron injections. He states he is due for another injection, he has just not been back to see his doctor.     The patients living will is active. His POA is son  Zack Tesfaye, back-up POA is Unknown at this time.  The patients current code status is FULL CODE. The patient does not want to linger on machines.     Encounter for subsequent AWV: Medicare wellness visit done, patient is in satisfactory living circumstances where the patient's needs are met.  This patient is advised to develop or update their living will and provide us a copy for the chart.    Review of Systems   All other systems reviewed and are  "negative.      Patient Care Team:  Christian Paiz MD as PCP - General  Christian Paiz MD as PCP - [facility] Attributed Provider     Objective   /76   Pulse 60   Temp 36.8 °C (98.2 °F)   Resp 12   Ht 1.778 m (5' 10\")   Wt 60.7 kg (133 lb 12.8 oz)   SpO2 97%   BMI 19.20 kg/m²     Physical Exam  Vitals reviewed.   Constitutional:       Appearance: Normal appearance.   Cardiovascular:      Rate and Rhythm: Normal rate and regular rhythm.      Pulses: Normal pulses.      Heart sounds: Normal heart sounds.   Pulmonary:      Effort: Pulmonary effort is normal.      Breath sounds: Normal breath sounds.   Abdominal:      General: Abdomen is flat.      Palpations: Abdomen is soft.   Musculoskeletal:      Cervical back: Normal range of motion and neck supple.   Neurological:      Mental Status: He is alert.             Assessment/Plan   Problem List Items Addressed This Visit       HTN (hypertension), benign - Primary     This condition is well controlled, continue with current medications.         Relevant Orders    CBC and Auto Differential    Comprehensive Metabolic Panel    Lipid Panel    Magnesium    Follow Up In [facility] - PCP - Established    Prostate cancer (Multi)     PSA is up, see specialist         Relevant Orders    Prostate Specific Antigen    Encounter for subsequent annual wellness visit (AWV) in Medicare patient     Medicare wellness visit done, patient is in satisfactory living circumstances where the patient's needs are met.  This patient is advised to develop or update their living will and provide us a copy for the chart.         Persistent atrial fibrillation (Multi)     This condition is stable, continue with current treatment.         Relevant Medications    digoxin (Lanoxin) 62.5 MCG tablet    metoprolol tartrate (Lopressor) 25 mg tablet    Chronic heart failure, unspecified heart failure type     This condition is stable, continue with current treatment.         Relevant " Medications    digoxin (Lanoxin) 62.5 MCG tablet    metoprolol tartrate (Lopressor) 25 mg tablet         Follow up in: 6 month(s) or sooner if needed with labs prior.    Scribe Attestation  By signing my name below, IAdrienne Scribe   attest that this documentation has been prepared under the direction and in the presence of Christian Paiz MD.   IChristian MD, personally performed the services described in the documentation as scribed by Adrienne Katz in my presence and confirm that it is both accurate and complete.

## 2025-04-15 ENCOUNTER — TELEPHONE (OUTPATIENT)
Dept: PRIMARY CARE | Facility: CLINIC | Age: 85
End: 2025-04-15
Payer: MEDICARE

## 2025-04-15 DIAGNOSIS — J39.2 DRY THROAT: Primary | ICD-10-CM

## 2025-04-15 RX ORDER — CHLORHEXIDINE GLUCONATE ORAL RINSE 1.2 MG/ML
15 SOLUTION DENTAL AS NEEDED
Qty: 120 ML | Refills: 0 | Status: SHIPPED | OUTPATIENT
Start: 2025-04-15 | End: 2025-04-29

## 2025-04-15 NOTE — TELEPHONE ENCOUNTER
Rx Refill Request Telephone Encounter    Name:  Nayan Tesfaye  :  [id number]  Medication Name:  chlorhexidine (Peridex) 0.12 % solution  Specific Pharmacy location:  [facility] [address]   Date of last appointment:  3/13  Date of next appointment:    Best number to reach patient:  [phone]

## 2025-05-29 DIAGNOSIS — J39.2 DRY THROAT: ICD-10-CM

## 2025-05-29 RX ORDER — CHLORHEXIDINE GLUCONATE ORAL RINSE 1.2 MG/ML
15 SOLUTION DENTAL AS NEEDED
Qty: 120 ML | Refills: 0 | Status: SHIPPED | OUTPATIENT
Start: 2025-05-29 | End: 2025-06-12

## 2025-05-29 NOTE — TELEPHONE ENCOUNTER
Rx Refill Request Telephone Encounter    Name:  Nayan Tesfaye  :  [id number]  Medication Name:  chlorhexidine (Peridex) 0.12 % solution  Specific Pharmacy location:  [facility] [address]   Date of last appointment:  3/13  Date of next appointment:    Best number to reach patient:  [phone]

## 2025-05-29 NOTE — TELEPHONE ENCOUNTER
Rx Refill Request Telephone Encounter    Name:  Nayan Tesfaye  :  [id number]  Medication Name:   1. chlorhexidine (Peridex) 0.12 % solution   Last fill: 04.15.2025 Q 120 ml for 14 days R 0  Specific Pharmacy location:  [facility] [address]   Date of last appointment:  3/13  Date of next appointment:    Best number to reach patient:  [phone]

## 2025-07-16 ENCOUNTER — TELEPHONE (OUTPATIENT)
Dept: PRIMARY CARE | Facility: CLINIC | Age: 85
End: 2025-07-16
Payer: MEDICARE

## 2025-07-16 NOTE — TELEPHONE ENCOUNTER
Rx Refill Request Telephone Encounter    Name:  Nayan Tesfaye  :  [id number]  Medication Name:  chlorhexidine (Peridex) 0.12 % solution  Specific Pharmacy location:  [facility] [address]   Date of last appointment:  3/13/2025  Date of next appointment:  2025  Best number to reach patient:  [phone]

## 2025-07-21 DIAGNOSIS — L85.3 DRY SKIN: Primary | ICD-10-CM

## 2025-07-21 NOTE — TELEPHONE ENCOUNTER
Rx Refill Request     Name: Nayan Tesfaye  :  [id number]   Medication Name:  PERIDEX  Specific Pharmacy location:  [facility]  Date of last appointment:  3/13/2025   Date of next appointment:  2025   Best number to reach patient:  [phone]

## 2025-07-22 RX ORDER — CHLORHEXIDINE GLUCONATE ORAL RINSE 1.2 MG/ML
15 SOLUTION DENTAL AS NEEDED
Qty: 120 ML | Refills: 0 | Status: SHIPPED | OUTPATIENT
Start: 2025-07-22 | End: 2025-08-21

## 2025-08-14 ENCOUNTER — APPOINTMENT (OUTPATIENT)
Dept: PRIMARY CARE | Facility: CLINIC | Age: 85
End: 2025-08-14
Payer: MEDICARE

## 2025-08-25 DIAGNOSIS — I10 HTN (HYPERTENSION), BENIGN: ICD-10-CM

## 2025-08-25 DIAGNOSIS — C61 PROSTATE CANCER (MULTI): ICD-10-CM

## 2025-09-16 ENCOUNTER — APPOINTMENT (OUTPATIENT)
Dept: PRIMARY CARE | Facility: CLINIC | Age: 85
End: 2025-09-16
Payer: MEDICARE